# Patient Record
Sex: FEMALE | Race: OTHER | Employment: UNEMPLOYED | URBAN - METROPOLITAN AREA
[De-identification: names, ages, dates, MRNs, and addresses within clinical notes are randomized per-mention and may not be internally consistent; named-entity substitution may affect disease eponyms.]

---

## 2019-01-01 LAB
ANTIBODY SCREEN, EXTERNAL: NEGATIVE
CHLAMYDIA, EXTERNAL: NEGATIVE
HBSAG, EXTERNAL: NEGATIVE
HIV, EXTERNAL: NEGATIVE
N. GONORRHEA, EXTERNAL: NEGATIVE
RPR, EXTERNAL: NORMAL
RUBELLA, EXTERNAL: NORMAL
TYPE, ABO & RH, EXTERNAL: NORMAL

## 2019-02-18 ENCOUNTER — HOSPITAL ENCOUNTER (EMERGENCY)
Age: 19
Discharge: HOME | End: 2019-02-18
Attending: OBSTETRICS & GYNECOLOGY | Admitting: OBSTETRICS & GYNECOLOGY
Payer: COMMERCIAL

## 2019-02-18 VITALS
HEART RATE: 98 BPM | WEIGHT: 228 LBS | TEMPERATURE: 98.7 F | BODY MASS INDEX: 36.64 KG/M2 | SYSTOLIC BLOOD PRESSURE: 121 MMHG | DIASTOLIC BLOOD PRESSURE: 73 MMHG | HEIGHT: 66 IN | RESPIRATION RATE: 16 BRPM

## 2019-02-18 PROCEDURE — 99282 EMERGENCY DEPT VISIT SF MDM: CPT

## 2019-02-18 PROCEDURE — 59025 FETAL NON-STRESS TEST: CPT

## 2019-02-19 NOTE — DISCHARGE INSTRUCTIONS
Learning About Prenatal Visits  Your Care Instructions    Regular prenatal visits are very important during any pregnancy. These quick office visits may seem simple and routine. But they can help you and your baby stay healthy. Your doctor is watching for problems that can only be found by regularly checking you and your baby. The visits also give you and your doctor time to build a good relationship. Many women have prenatal visits every 4 to 6 weeks until week 28 of pregnancy. Then the visits become more frequent. This is often every 2 to 3 weeks through week 36 of pregnancy. In the final month of pregnancy, you likely will see your doctor every week. You may have a different schedule if you have a medical problem or are a teen. At different times in your pregnancy, you will have exams and tests. Some are routine. Others are done only when there is a chance of a problem. Everything healthy you do for your body helps your growing baby. Rest when you need it. Eat well, drink plenty of water, and exercise regularly. What happens during a prenatal visit? · You will have blood pressure checks, along with urine tests. You also may have blood tests. If you need to go to the bathroom while waiting for the doctor, tell the nurse. He or she will give you a sample cup so your urine can be tested. · You will be weighed and have your belly measured. · Your doctor may listen to your baby's heartbeat with a special stethoscope. · In your second trimester, your doctor will check your blood sugar (glucose tolerance test) for diabetes that can occur during pregnancy. This is gestational diabetes, which can harm your baby. · You will have tests to check for infections that could harm your . These include group B streptococcus and hepatitis B.  · Your doctor may do ultrasounds to check for problems. This also checks your baby's position. An ultrasound uses sound waves to produce a picture of your baby.   · You may have other tests at any time during your pregnancy. · Use your visits to discuss with your doctor any concerns you have. How can you care for yourself at home? · Get plenty of rest.  · Exercise every day, if your doctor says it is okay. If you have not exercised in the past, start out slowly. Take many short walks each day. · Eat a balanced diet. Make sure your diet includes plenty of beans, peas, and leafy green vegetables. · Drink plenty of fluids, enough so that your urine is light yellow or clear like water. Drink water. Cut down on drinks with caffeine, such as coffee, tea, and cola. If you have kidney, heart, or liver disease and have to limit fluids, talk with your doctor before you increase the amount of fluids you drink. · Avoid tobacco smoke, alcohol and drugs, chemical fumes, paint fumes, and poisons. Do not smoke or use tobacco. If you need help quitting, talk to your doctor about stop-smoking programs and medicines. These can increase your chances of quitting for good. · Review all of your medicines with your doctor. Some of your routine medicines may need to be changed to protect your baby. Do not stop or start taking any medicines without talking to your doctor first.  Follow-up care is a key part of your treatment and safety. Be sure to make and go to all appointments, and call your doctor if you are having problems. It's also a good idea to know your test results and keep a list of the medicines you take. Where can you learn more? Go to http://светлана-elizabeth.info/. Enter J502 in the search box to learn more about \"Learning About Prenatal Visits. \"  Current as of: September 5, 2018  Content Version: 11.9  © 6989-3976 [employer]. Care instructions adapted under license by [employer] (which disclaims liability or warranty for this information).  If you have questions about a medical condition or this instruction, always ask your healthcare professional. [employer], [facility] disclaims any warranty or liability for your use of this information. Pregnancy Precautions: Care Instructions  Your Care Instructions    There is no sure way to prevent labor before your due date ( labor) or to prevent most other pregnancy problems. But there are things you can do to increase your chances of a healthy pregnancy. Go to your appointments, follow your doctor's advice, and take good care of yourself. Eat well, and exercise (if your doctor agrees). And make sure to drink plenty of water. Follow-up care is a key part of your treatment and safety. Be sure to make and go to all appointments, and call your doctor if you are having problems. It's also a good idea to know your test results and keep a list of the medicines you take. How can you care for yourself at home? · Make sure you go to your prenatal appointments. At each visit, your doctor will check your blood pressure. Your doctor will also check to see if you have protein in your urine. High blood pressure and protein in urine are signs of preeclampsia. This condition can be dangerous for you and your baby. · Drink plenty of fluids, enough so that your urine is light yellow or clear like water. Dehydration can cause contractions. If you have kidney, heart, or liver disease and have to limit fluids, talk with your doctor before you increase the amount of fluids you drink. · Tell your doctor right away if you notice any symptoms of an infection, such as:  ? Burning when you urinate. ? A foul-smelling discharge from your vagina. ? Vaginal itching. ? Unexplained fever. ? Unusual pain or soreness in your uterus or lower belly. · Eat a balanced diet. Include plenty of foods that are high in calcium and iron. ? Foods high in calcium include milk, cheese, yogurt, almonds, and broccoli. ?  Foods high in iron include red meat, shellfish, poultry, eggs, beans, raisins, whole-grain bread, and leafy green vegetables. · Do not smoke. If you need help quitting, talk to your doctor about stop-smoking programs and medicines. These can increase your chances of quitting for good. · Do not drink alcohol or use illegal drugs. · Follow your doctor's directions about activity. Your doctor will let you know how much, if any, exercise you can do. · Ask your doctor if you can have sex. If you are at risk for early labor, your doctor may ask you to not have sex. · Take care to prevent falls. During pregnancy, your joints are loose, and your balance is off. Sports such as bicycling, skiing, or in-line skating can increase your risk of falling. And don't ride horses or motorcycles, dive, water ski, scuba dive, or parachute jump while you are pregnant. · Avoid getting very hot. Do not use saunas or hot tubs. Avoid staying out in the sun in hot weather for long periods. Take acetaminophen (Tylenol) to lower a high fever. · Do not take any over-the-counter or herbal medicines or supplements without talking to your doctor or pharmacist first.  When should you call for help? Call 911 anytime you think you may need emergency care. For example, call if:    · You passed out (lost consciousness).     · You have severe vaginal bleeding.     · You have severe pain in your belly or pelvis.     · You have had fluid gushing or leaking from your vagina and you know or think the umbilical cord is bulging into your vagina. If this happens, immediately get down on your knees so your rear end (buttocks) is higher than your head. This will decrease the pressure on the cord until help arrives.   [place] your doctor now or seek immediate medical care if:    · You have signs of preeclampsia, such as:  ? Sudden swelling of your face, hands, or feet. ? New vision problems (such as dimness or blurring).   ? A severe headache.     · You have any vaginal bleeding.     · You have belly pain or cramping.     · You have a fever.     · You have had regular contractions (with or without pain) for an hour. This means that you have 8 or more within 1 hour or 4 or more in 20 minutes after you change your position and drink fluids.     · You have a sudden release of fluid from your vagina.     · You have low back pain or pelvic pressure that does not go away.     · You notice that your baby has stopped moving or is moving much less than normal.    Watch closely for changes in your health, and be sure to contact your doctor if you have any problems. Where can you learn more? Go to http://светлана-elizabeth.info/. Enter [id number] in the search box to learn more about \"Pregnancy Precautions: Care Instructions. \"  Current as of: September 5, 2018  Content Version: 11.9  © 4880-0714 [employer]. Care instructions adapted under license by [employer] (which disclaims liability or warranty for this information). If you have questions about a medical condition or this instruction, always ask your healthcare professional. [place] [address] any warranty or liability for your use of this information. Counting Your Baby's Kicks: Care Instructions  Your Care Instructions    Counting your baby's kicks is one way your doctor can tell that your baby is healthy. Most women--especially in a first pregnancy--feel their baby move for the first time between 16 and 22 weeks. The movement may feel like flutters rather than kicks. Your baby may move more at certain times of the day. When you are active, you may notice less kicking than when you are resting. At your prenatal visits, your doctor will ask whether the baby is active. In your last trimester, your doctor may ask you to count the number of times you feel your baby move. Follow-up care is a key part of your treatment and safety. Be sure to make and go to all appointments, and call your doctor if you are having problems.  It's also a good idea to know your test results and keep a list of the medicines you take. How do you count fetal kicks? · A common method of checking your baby's movement is to count the number of kicks or moves you feel in 1 hour. Ten movements (such as kicks, flutters, or rolls) in 1 hour are normal. Some doctors suggest that you count in the morning until you get to 10 movements. Then you can quit for that day and start again the next day. · Pick your baby's most active time of day to count. This may be any time from morning to evening. · If you do not feel 10 movements in an hour, your baby may be sleeping. Wait for the next hour and count again. When should you call for help? Call your doctor now or seek immediate medical care if:    · You noticed that your baby has stopped moving or is moving much less than normal.    Watch closely for changes in your health, and be sure to contact your doctor if you have any problems. Where can you learn more? Go to http://светлана-elizabeth.info/. Enter J146 in the search box to learn more about \"Counting Your Baby's Kicks: Care Instructions. \"  Current as of: September 5, 2018  Content Version: 11.9  © 3496-9563 [employer]. Care instructions adapted under license by [employer] (which disclaims liability or warranty for this information). If you have questions about a medical condition or this instruction, always ask your healthcare professional. [place] [address] any warranty or liability for your use of this information.

## 2019-02-19 NOTE — PROGRESS NOTES
1846 Patient arrives to unit with complaints of decreased fetal movement onset two months ago. Patient states she has not been seen in one month due to difficulties transferring offices. Patient placed on EFM. [address]. Gustavo Gomez MD paged. [address] [facility]. Gustavo Gomez MD called back. SBAR discussed. Patient may be discharged at this time. 6144 I have reviewed discharge instructions with the patient and spouse. The patient and spouse verbalized understanding. Patient ambulated off unit.

## 2019-03-13 PROBLEM — B95.1 GROUP B STREPTOCOCCUS URINARY TRACT INFECTION AFFECTING PREGNANCY IN THIRD TRIMESTER: Status: ACTIVE | Noted: 2019-03-13

## 2019-03-13 PROBLEM — O23.43 GROUP B STREPTOCOCCUS URINARY TRACT INFECTION AFFECTING PREGNANCY IN THIRD TRIMESTER: Status: ACTIVE | Noted: 2019-03-13

## 2019-03-16 PROBLEM — Z3A.36 PREGNANCY WITH 36 COMPLETED WEEKS GESTATION: Status: ACTIVE | Noted: 2019-03-16

## 2019-03-18 PROBLEM — O09.899 MATERNAL VARICELLA, NON-IMMUNE: Status: ACTIVE | Noted: 2019-03-18

## 2019-03-18 PROBLEM — R79.89 ELEVATED TSH: Status: ACTIVE | Noted: 2019-03-18

## 2019-03-18 PROBLEM — Z28.39 MATERNAL VARICELLA, NON-IMMUNE: Status: ACTIVE | Noted: 2019-03-18

## 2019-03-18 PROBLEM — R82.5 POSITIVE URINE DRUG SCREEN: Status: ACTIVE | Noted: 2019-03-18

## 2019-03-25 ENCOUNTER — HOSPITAL ENCOUNTER (OUTPATIENT)
Age: 19
Discharge: HOME | End: 2019-03-25
Attending: OBSTETRICS & GYNECOLOGY | Admitting: OBSTETRICS & GYNECOLOGY
Payer: COMMERCIAL

## 2019-03-25 VITALS
HEART RATE: 92 BPM | WEIGHT: 237 LBS | RESPIRATION RATE: 18 BRPM | TEMPERATURE: 98.6 F | DIASTOLIC BLOOD PRESSURE: 78 MMHG | BODY MASS INDEX: 38.09 KG/M2 | HEIGHT: 66 IN | SYSTOLIC BLOOD PRESSURE: 124 MMHG

## 2019-03-25 PROBLEM — Z3A.37 37 WEEKS GESTATION OF PREGNANCY: Status: ACTIVE | Noted: 2019-03-25

## 2019-03-25 PROBLEM — Z33.1 IUP (INTRAUTERINE PREGNANCY), INCIDENTAL: Status: ACTIVE | Noted: 2019-03-25

## 2019-03-25 PROCEDURE — 59025 FETAL NON-STRESS TEST: CPT

## 2019-03-25 NOTE — DISCHARGE INSTRUCTIONS
Patient Education   Patient Education   Patient Education        Week 37 of Your Pregnancy: Care Instructions  Your Care Instructions    You are near the end of your pregnancy--and you're probably pretty uncomfortable. It may be harder to walk around. Lying down probably isn't comfortable either. You may have trouble getting to sleep or staying asleep. Most women deliver their babies between 40 and 41 weeks. This is a good time to think about packing a bag for the hospital with items you'll need. Then you'll be ready when labor starts. Follow-up care is a key part of your treatment and safety. Be sure to make and go to all appointments, and call your doctor if you are having problems. It's also a good idea to know your test results and keep a list of the medicines you take. How can you care for yourself at home? Learn about breastfeeding  · Breastfeeding is best for your baby and good for you. · Breast milk has antibodies to help your baby fight infections. · Mothers who breastfeed often lose weight faster, because making milk burns calories. · Learning the best ways to hold your baby will make breastfeeding easier. · Let your partner bathe and diaper the baby to keep your partner from feeling left out. Snuggle together when you breastfeed. · You may want to learn how to use a breast pump and store your milk. · If you choose to bottle feed, make the feeding feel like breastfeeding so you can bond with your baby. Always hold your baby and the bottle. Do not prop bottles or let your baby fall asleep with a bottle. Learn about crying  · It is common for babies to cry for 1 to 3 hours a day. Some cry more, some cry less. · Babies don't cry to make you upset or because you are a bad parent. · Crying is how your baby communicates. Your baby may be hungry; have gas; need a diaper change; or feel cold, warm, tired, lonely, or tense. Sometimes babies cry for unknown reasons.   · If you respond to your baby's needs, he or she will learn to trust you. · Try to stay calm when your baby cries. Your baby may get more upset if he or she senses that you are upset. Know how to care for your   · Your baby's umbilical cord stump will drop off on its own, usually between 1 and 2 weeks. To care for your baby's umbilical cord area:  ? Clean the area at the bottom of the cord 2 or 3 times a day. ? Pay special attention to the area where the cord attaches to the skin. ? Keep the diaper folded below the cord. ? Use a damp washcloth or cotton ball to sponge bathe your baby until the stump has come off. · Your baby's first dark stool is called meconium. After the meconium is passed, your baby will develop his or her own bowel pattern. ? Some babies, especially  babies, have several bowel movements a day. Others have one or two a day, or one every 2 to 3 days. ?  babies often have loose, yellow stools. Formula-fed babies have more formed stools. ? If your baby's stools look like little pellets, he or she is constipated. After 2 days of constipation, call your baby's doctor. · If your baby will be circumcised, you can care for him at home. ? Gently rinse his penis with warm water after every diaper change. Do not try to remove the film that forms on the penis. This film will go away on its own. Pat dry. ? Put petroleum ointment, such as Vaseline, on the area of the diaper that will touch your baby's penis. This will keep the diaper from sticking to your baby. ? Ask the doctor about giving your baby acetaminophen (Tylenol) for pain. Where can you learn more? Go to http://светлана-elizabeth.info/. Enter [id number] in the search box to learn more about \"Week 37 of Your Pregnancy: Care Instructions. \"  Current as of: 2018  Content Version: 11.9  © 5783-9894 [employer].  Care instructions adapted under license by [employer] (which disclaims liability or warranty for this information). If you have questions about a medical condition or this instruction, always ask your healthcare professional. [address] any warranty or liability for your use of this information. Counting Your Baby's Kicks: Care Instructions  Your Care Instructions    Counting your baby's kicks is one way your doctor can tell that your baby is healthy. Most women--especially in a first pregnancy--feel their baby move for the first time between 16 and 22 weeks. The movement may feel like flutters rather than kicks. Your baby may move more at certain times of the day. When you are active, you may notice less kicking than when you are resting. At your prenatal visits, your doctor will ask whether the baby is active. In your last trimester, your doctor may ask you to count the number of times you feel your baby move. Follow-up care is a key part of your treatment and safety. Be sure to make and go to all appointments, and call your doctor if you are having problems. It's also a good idea to know your test results and keep a list of the medicines you take. How do you count fetal kicks? · A common method of checking your baby's movement is to count the number of kicks or moves you feel in 1 hour. Ten movements (such as kicks, flutters, or rolls) in 1 hour are normal. Some doctors suggest that you count in the morning until you get to 10 movements. Then you can quit for that day and start again the next day. · Pick your baby's most active time of day to count. This may be any time from morning to evening. · If you do not feel 10 movements in an hour, your baby may be sleeping. Wait for the next hour and count again. When should you call for help?   Call your doctor now or seek immediate medical care if:    · You noticed that your baby has stopped moving or is moving much less than normal.    Watch closely for changes in your health, and be sure to contact your doctor if you have any problems. Where can you learn more? Go to http://светлана-elizabeth.info/. Enter V698 in the search box to learn more about \"Counting Your Baby's Kicks: Care Instructions. \"  Current as of: September 5, 2018  Content Version: 11.9  © 0569-6847 [employer]. Care instructions adapted under license by [employer] (which disclaims liability or warranty for this information). If you have questions about a medical condition or this instruction, always ask your healthcare professional. [address] any warranty or liability for your use of this information. Learning About When to Call Your Doctor During Pregnancy (After 20 Weeks)  Your Care Instructions  It's common to have concerns about what might be a problem during pregnancy. Although most pregnant women don't have any serious problems, it's important to know when to call your doctor if you have certain symptoms or signs of labor. These are general suggestions. Your doctor may give you some more information about when to call. When to call your doctor (after 20 weeks)  Call 911 anytime you think you may need emergency care. For example, call if:  · You have severe vaginal bleeding. · You have sudden, severe pain in your belly. · You passed out (lost consciousness). · You have a seizure. · You see or feel the umbilical cord. · You think you are about to deliver your baby and can't make it safely to the hospital.  Call your doctor now or seek immediate medical care if:  · You have vaginal bleeding. · You have belly pain. · You have a fever. · You have symptoms of preeclampsia, such as:  ? Sudden swelling of your face, hands, or feet. ? New vision problems (such as dimness or blurring). ? A severe headache. · You have a sudden release of fluid from your vagina. (You think your water broke.)  · You think that you may be in labor.  This means that you've had at least 4 contractions within 20 minutes or at least 8 contractions in an hour. · You notice that your baby has stopped moving or is moving much less than normal.  · You have symptoms of a urinary tract infection. These may include:  ? Pain or burning when you urinate. ? A frequent need to urinate without being able to pass much urine. ? Pain in the flank, which is just below the rib cage and above the waist on either side of the back. ? Blood in your urine. Watch closely for changes in your health, and be sure to contact your doctor if:  · You have vaginal discharge that smells bad. · You have skin changes, such as:  ? A rash. ? Itching. ? Yellow color to your skin. · You have other concerns about your pregnancy. If you have labor signs at 37 weeks or more  If you have signs of labor at 37 weeks or more, your doctor may tell you to call when your labor becomes more active. Symptoms of active labor include:  · Contractions that are regular. · Contractions that are less than 5 minutes apart. · Contractions that are hard to talk through. Follow-up care is a key part of your treatment and safety. Be sure to make and go to all appointments, and call your doctor if you are having problems. It's also a good idea to know your test results and keep a list of the medicines you take. Where can you learn more? Go to http://светлана-elizabeth.info/. Enter  in the search box to learn more about \"Learning About When to Call Your Doctor During Pregnancy (After 20 Weeks). \"  Current as of: September 5, 2018  Content Version: 11.9  © 3330-6433 [employer]. Care instructions adapted under license by [employer] (which disclaims liability or warranty for this information). If you have questions about a medical condition or this instruction, always ask your healthcare professional. [address] any warranty or liability for your use of this information.

## 2019-03-25 NOTE — PROGRESS NOTES
1900 NST reactive, EFM removed. Patient reports fetal movment, denies pain or concerns. 6210 [facility] paged Dr. Shawna Sosa with prompt return call. MD notified of patient, VS WNL, denies concerns, good fetal movement, NST reactive. Discharge orders received    1913 Discharge instructions reviewed with patient. Patient verbalizes understanding and denies concerns or needs. 1916 patient ambulates off unit.

## 2019-04-15 PROBLEM — O48.0 POST-TERM PREGNANCY, 40-42 WEEKS OF GESTATION: Status: ACTIVE | Noted: 2019-03-16

## 2019-04-15 PROBLEM — Z3A.40 40 WEEKS GESTATION OF PREGNANCY: Status: ACTIVE | Noted: 2019-03-25

## 2019-04-16 ENCOUNTER — HOSPITAL ENCOUNTER (INPATIENT)
Age: 19
LOS: 4 days | Discharge: HOME | DRG: 560 | End: 2019-04-20
Attending: OBSTETRICS & GYNECOLOGY | Admitting: OBSTETRICS & GYNECOLOGY
Payer: COMMERCIAL

## 2019-04-16 PROBLEM — O36.8130 DECREASED FETAL MOVEMENTS IN THIRD TRIMESTER: Status: ACTIVE | Noted: 2019-04-16

## 2019-04-16 LAB
BASOPHILS # BLD: 0 K/UL (ref 0–0.1)
BASOPHILS NFR BLD: 0 % (ref 0–2)
DIFFERENTIAL METHOD BLD: ABNORMAL
EOSINOPHIL # BLD: 0.1 K/UL (ref 0–0.4)
EOSINOPHIL NFR BLD: 1 % (ref 0–5)
ERYTHROCYTE [DISTWIDTH] IN BLOOD BY AUTOMATED COUNT: 13.9 % (ref 11.6–14.5)
HCT VFR BLD AUTO: 37 % (ref 35–45)
HGB BLD-MCNC: 12.3 G/DL (ref 12–16)
LYMPHOCYTES # BLD: 1.9 K/UL (ref 0.9–3.6)
LYMPHOCYTES NFR BLD: 12 % (ref 21–52)
MCH RBC QN AUTO: 28.3 PG (ref 24–34)
MCHC RBC AUTO-ENTMCNC: 33.2 G/DL (ref 31–37)
MCV RBC AUTO: 85.3 FL (ref 74–97)
MONOCYTES # BLD: 0.7 K/UL (ref 0.05–1.2)
MONOCYTES NFR BLD: 4 % (ref 3–10)
NEUTS SEG # BLD: 13.5 K/UL (ref 1.8–8)
NEUTS SEG NFR BLD: 83 % (ref 40–73)
PLATELET # BLD AUTO: 240 K/UL (ref 135–420)
PMV BLD AUTO: 9.8 FL (ref 9.2–11.8)
RBC # BLD AUTO: 4.34 M/UL (ref 4.2–5.3)
WBC # BLD AUTO: 16.1 K/UL (ref 4.6–13.2)

## 2019-04-16 PROCEDURE — 85025 COMPLETE CBC W/AUTO DIFF WBC: CPT

## 2019-04-16 PROCEDURE — 36415 COLL VENOUS BLD VENIPUNCTURE: CPT

## 2019-04-16 PROCEDURE — 59025 FETAL NON-STRESS TEST: CPT

## 2019-04-16 PROCEDURE — 76817 TRANSVAGINAL US OBSTETRIC: CPT

## 2019-04-16 PROCEDURE — 86900 BLOOD TYPING SEROLOGIC ABO: CPT

## 2019-04-16 PROCEDURE — 65270000029 HC RM PRIVATE

## 2019-04-16 PROCEDURE — 99282 EMERGENCY DEPT VISIT SF MDM: CPT

## 2019-04-16 RX ORDER — BUTORPHANOL TARTRATE 2 MG/ML
2 INJECTION INTRAMUSCULAR; INTRAVENOUS
Status: DISCONTINUED | OUTPATIENT
Start: 2019-04-16 | End: 2019-04-18 | Stop reason: HOSPADM

## 2019-04-16 RX ORDER — MINERAL OIL
30 OIL (ML) ORAL AS NEEDED
Status: COMPLETED | OUTPATIENT
Start: 2019-04-16 | End: 2019-04-18

## 2019-04-16 RX ORDER — HYDROMORPHONE HYDROCHLORIDE 1 MG/ML
1 INJECTION, SOLUTION INTRAMUSCULAR; INTRAVENOUS; SUBCUTANEOUS
Status: DISCONTINUED | OUTPATIENT
Start: 2019-04-16 | End: 2019-04-18 | Stop reason: HOSPADM

## 2019-04-16 RX ORDER — TERBUTALINE SULFATE 1 MG/ML
0.25 INJECTION SUBCUTANEOUS
Status: DISCONTINUED | OUTPATIENT
Start: 2019-04-16 | End: 2019-04-18 | Stop reason: HOSPADM

## 2019-04-16 RX ORDER — OXYTOCIN/RINGER'S LACTATE 20/1000 ML
125 PLASTIC BAG, INJECTION (ML) INTRAVENOUS CONTINUOUS
Status: DISCONTINUED | OUTPATIENT
Start: 2019-04-16 | End: 2019-04-18 | Stop reason: HOSPADM

## 2019-04-16 RX ORDER — LIDOCAINE HYDROCHLORIDE 10 MG/ML
20 INJECTION, SOLUTION EPIDURAL; INFILTRATION; INTRACAUDAL; PERINEURAL AS NEEDED
Status: DISCONTINUED | OUTPATIENT
Start: 2019-04-16 | End: 2019-04-18 | Stop reason: HOSPADM

## 2019-04-16 RX ORDER — METHYLERGONOVINE MALEATE 0.2 MG/ML
0.2 INJECTION INTRAVENOUS AS NEEDED
Status: DISCONTINUED | OUTPATIENT
Start: 2019-04-16 | End: 2019-04-18 | Stop reason: HOSPADM

## 2019-04-16 RX ORDER — NALBUPHINE HYDROCHLORIDE 10 MG/ML
10 INJECTION, SOLUTION INTRAMUSCULAR; INTRAVENOUS; SUBCUTANEOUS
Status: DISCONTINUED | OUTPATIENT
Start: 2019-04-16 | End: 2019-04-18 | Stop reason: HOSPADM

## 2019-04-16 RX ORDER — OXYTOCIN/RINGER'S LACTATE 20/1000 ML
999 PLASTIC BAG, INJECTION (ML) INTRAVENOUS ONCE
Status: ACTIVE | OUTPATIENT
Start: 2019-04-16 | End: 2019-04-17

## 2019-04-16 RX ORDER — ONDANSETRON 2 MG/ML
4 INJECTION INTRAMUSCULAR; INTRAVENOUS
Status: DISCONTINUED | OUTPATIENT
Start: 2019-04-16 | End: 2019-04-18 | Stop reason: HOSPADM

## 2019-04-16 RX ORDER — OXYTOCIN/0.9 % SODIUM CHLORIDE 30/500 ML
1-25 PLASTIC BAG, INJECTION (ML) INTRAVENOUS
Status: DISCONTINUED | OUTPATIENT
Start: 2019-04-17 | End: 2019-04-18 | Stop reason: HOSPADM

## 2019-04-16 RX ORDER — SODIUM CHLORIDE, SODIUM LACTATE, POTASSIUM CHLORIDE, CALCIUM CHLORIDE 600; 310; 30; 20 MG/100ML; MG/100ML; MG/100ML; MG/100ML
125 INJECTION, SOLUTION INTRAVENOUS CONTINUOUS
Status: DISCONTINUED | OUTPATIENT
Start: 2019-04-16 | End: 2019-04-18 | Stop reason: HOSPADM

## 2019-04-17 ENCOUNTER — ANESTHESIA EVENT (OUTPATIENT)
Dept: LABOR AND DELIVERY | Age: 19
DRG: 560 | End: 2019-04-17
Payer: COMMERCIAL

## 2019-04-17 ENCOUNTER — ANESTHESIA (OUTPATIENT)
Dept: LABOR AND DELIVERY | Age: 19
DRG: 560 | End: 2019-04-17
Payer: COMMERCIAL

## 2019-04-17 LAB
ABO + RH BLD: NORMAL
BLOOD GROUP ANTIBODIES SERPL: NORMAL
SPECIMEN EXP DATE BLD: NORMAL

## 2019-04-17 PROCEDURE — 3E0R3BZ INTRODUCTION OF ANESTHETIC AGENT INTO SPINAL CANAL, PERCUTANEOUS APPROACH: ICD-10-PCS | Performed by: ANESTHESIOLOGY

## 2019-04-17 PROCEDURE — 3E033VJ INTRODUCTION OF OTHER HORMONE INTO PERIPHERAL VEIN, PERCUTANEOUS APPROACH: ICD-10-PCS | Performed by: ADVANCED PRACTICE MIDWIFE

## 2019-04-17 PROCEDURE — 74011250636 HC RX REV CODE- 250/636: Performed by: OBSTETRICS & GYNECOLOGY

## 2019-04-17 PROCEDURE — 74011000250 HC RX REV CODE- 250: Performed by: ANESTHESIOLOGY

## 2019-04-17 PROCEDURE — 77030007879 HC KT SPN EPDRL TELE -B: Performed by: ANESTHESIOLOGY

## 2019-04-17 PROCEDURE — 65270000029 HC RM PRIVATE

## 2019-04-17 PROCEDURE — 74011000250 HC RX REV CODE- 250

## 2019-04-17 PROCEDURE — 74011250636 HC RX REV CODE- 250/636: Performed by: ANESTHESIOLOGY

## 2019-04-17 PROCEDURE — 75410000002 HC LABOR FEE PER 1 HR

## 2019-04-17 RX ORDER — NALOXONE HYDROCHLORIDE 0.4 MG/ML
0.2 INJECTION, SOLUTION INTRAMUSCULAR; INTRAVENOUS; SUBCUTANEOUS AS NEEDED
Status: DISCONTINUED | OUTPATIENT
Start: 2019-04-17 | End: 2019-04-18 | Stop reason: HOSPADM

## 2019-04-17 RX ORDER — FENTANYL CITRATE 50 UG/ML
INJECTION, SOLUTION INTRAMUSCULAR; INTRAVENOUS AS NEEDED
Status: DISCONTINUED | OUTPATIENT
Start: 2019-04-17 | End: 2019-04-18 | Stop reason: HOSPADM

## 2019-04-17 RX ORDER — LIDOCAINE HYDROCHLORIDE AND EPINEPHRINE 15; 5 MG/ML; UG/ML
INJECTION, SOLUTION EPIDURAL AS NEEDED
Status: DISCONTINUED | OUTPATIENT
Start: 2019-04-17 | End: 2019-04-18 | Stop reason: HOSPADM

## 2019-04-17 RX ORDER — DIPHENHYDRAMINE HYDROCHLORIDE 50 MG/ML
25 INJECTION, SOLUTION INTRAMUSCULAR; INTRAVENOUS
Status: DISCONTINUED | OUTPATIENT
Start: 2019-04-17 | End: 2019-04-18 | Stop reason: HOSPADM

## 2019-04-17 RX ORDER — NALBUPHINE HYDROCHLORIDE 10 MG/ML
2.5 INJECTION, SOLUTION INTRAMUSCULAR; INTRAVENOUS; SUBCUTANEOUS
Status: DISCONTINUED | OUTPATIENT
Start: 2019-04-17 | End: 2019-04-18 | Stop reason: HOSPADM

## 2019-04-17 RX ORDER — FENTANYL/ROPIVACAINE/NS/PF 2MCG/ML-.1
1-22 PLASTIC BAG, INJECTION (ML) EPIDURAL
Status: DISCONTINUED | OUTPATIENT
Start: 2019-04-17 | End: 2019-04-18 | Stop reason: HOSPADM

## 2019-04-17 RX ORDER — SODIUM CHLORIDE 0.9 % (FLUSH) 0.9 %
5-40 SYRINGE (ML) INJECTION EVERY 8 HOURS
Status: DISCONTINUED | OUTPATIENT
Start: 2019-04-17 | End: 2019-04-18 | Stop reason: HOSPADM

## 2019-04-17 RX ORDER — SODIUM CHLORIDE 0.9 % (FLUSH) 0.9 %
5-40 SYRINGE (ML) INJECTION AS NEEDED
Status: DISCONTINUED | OUTPATIENT
Start: 2019-04-17 | End: 2019-04-18 | Stop reason: HOSPADM

## 2019-04-17 RX ORDER — FENTANYL CITRATE 50 UG/ML
100 INJECTION, SOLUTION INTRAMUSCULAR; INTRAVENOUS ONCE
Status: DISPENSED | OUTPATIENT
Start: 2019-04-17 | End: 2019-04-18

## 2019-04-17 RX ADMIN — BUTORPHANOL TARTRATE 2 MG: 2 INJECTION, SOLUTION INTRAMUSCULAR; INTRAVENOUS at 18:28

## 2019-04-17 RX ADMIN — VANCOMYCIN HYDROCHLORIDE 1000 MG: 1 INJECTION, POWDER, LYOPHILIZED, FOR SOLUTION INTRAVENOUS at 23:22

## 2019-04-17 RX ADMIN — BUTORPHANOL TARTRATE 2 MG: 2 INJECTION, SOLUTION INTRAMUSCULAR; INTRAVENOUS at 00:35

## 2019-04-17 RX ADMIN — Medication 8 MILLI-UNITS/MIN: at 12:39

## 2019-04-17 RX ADMIN — Medication 2 MILLI-UNITS/MIN: at 10:57

## 2019-04-17 RX ADMIN — VANCOMYCIN HYDROCHLORIDE 1000 MG: 1 INJECTION, POWDER, LYOPHILIZED, FOR SOLUTION INTRAVENOUS at 10:55

## 2019-04-17 RX ADMIN — SODIUM CHLORIDE, SODIUM LACTATE, POTASSIUM CHLORIDE, AND CALCIUM CHLORIDE 125 ML/HR: 600; 310; 30; 20 INJECTION, SOLUTION INTRAVENOUS at 09:31

## 2019-04-17 RX ADMIN — LIDOCAINE HYDROCHLORIDE AND EPINEPHRINE 5 ML: 15; 5 INJECTION, SOLUTION EPIDURAL at 21:06

## 2019-04-17 RX ADMIN — FENTANYL CITRATE 100 MCG: 50 INJECTION, SOLUTION INTRAMUSCULAR; INTRAVENOUS at 21:07

## 2019-04-17 RX ADMIN — ONDANSETRON 4 MG: 2 INJECTION INTRAMUSCULAR; INTRAVENOUS at 21:43

## 2019-04-17 RX ADMIN — SODIUM CHLORIDE, SODIUM LACTATE, POTASSIUM CHLORIDE, AND CALCIUM CHLORIDE 1000 ML: 600; 310; 30; 20 INJECTION, SOLUTION INTRAVENOUS at 20:52

## 2019-04-17 RX ADMIN — ROPIVACAINE HYDROCHLORIDE 15 ML/HR: 10 INJECTION, SOLUTION EPIDURAL at 21:10

## 2019-04-17 NOTE — PROGRESS NOTES
2150-Assumed care of pt at this time. Pt comes to unit with c/o decreased fetal mvmt. Pt states she has not felt the baby move really at all since [address] and US applied. 2200-O'Fabiana at bedside to do fetal ultrasound for JAMES. SVE 1/50/-3.  
 
2225-O'Fabiana admits pt for induction at this time. Cook balloon to be inserted over night. 2230-Cook balloon inserted at this time. Balloon filled with 80/80 normal saline 
 
0720-Bedside and verbal shift change report given to JAI Davidson RN (oncoming nurse) by REDD Lozano RN (offgoing nurse). Report included the following information SBAR, Kardex and MAR.

## 2019-04-17 NOTE — PROGRESS NOTES
Labor Progress Note Patient seen, fetal heart rate and contraction pattern evaluated, patient examined. Patient had some cramping and bloody show overnight. No data found. Physical Exam: 
Cervical Exam:  Not performed Membranes:  Intact Uterine Activity: occasional 
Fetal Heart Rate: Reactive Assessment/Plan: 
Continue plan for vaginal delivery. Remove cook this morning and start pitocin induction of labor.

## 2019-04-17 NOTE — PROGRESS NOTES
Labor Progress Note Patient seen, fetal heart rate and contraction pattern evaluated, patient examined. Patient presented to labor and delivery for decreased fetal movement. Only felt the baby move once this morning. Denies vaginal bleeding, leakage of fluid, contractions. She has been eating and drinking today. Patient Vitals for the past 1 hrs: 
 BP Temp Pulse Resp  
04/16/19 2141 122/78 97.7 °F (36.5 °C) (!) 118 14 Physical Exam: 
Cervical Exam:  1 cm dilated/50/-3 Membranes:  Intact Uterine Activity: None Fetal Heart Rate: Baseline: 140's per minute. Not reactive yet. Ultrasound performed. MVP 3.22. Baby appears to be OP. Assessment/Plan: 
Continue to monitor baby. Need to get reactive NST prior to discharging patient.

## 2019-04-17 NOTE — H&P
History & Physical 
 
Name: Herman Godinez MRN: [id number]  SSN: [id number] YOB: 2000  Age: 22 y.o. Sex: female Subjective:  
 
Estimated Date of Delivery: 19 OB History  Para Term  AB Living 1 SAB TAB Ectopic Molar Multiple Live Births # Outcome Date GA Lbr Jin/2nd Weight Sex Delivery Anes PTL Lv  
1 Current Ms. Doyle Valdes is admitted with pregnancy at 40w4d for induction of labor due to nonreactive nonstress test and decreased fetal movement, post dates. Prenatal course was complicated by GBS UTI, maternal varicella non immune. Please see prenatal records for details. Past Medical History:  
Diagnosis Date  Epilepsy ([address]  Migraines  PTSD (post-traumatic stress disorder)  STD (female) 2018 GC/CT  
 Trauma Past Surgical History:  
Procedure Laterality Date  HX HERNIA REPAIR    
 HX OTHER SURGICAL Social History Occupational History  Not on file Tobacco Use  Smoking status: Never Smoker  Smokeless tobacco: Never Used Substance and Sexual Activity  Alcohol use: No  
  Frequency: Never  Drug use: No  
 Sexual activity: Yes  
  Partners: Male Birth control/protection: None Family History Problem Relation Age of Onset  Bleeding Prob Mother  Thyroid Disease Mother Allergies Allergen Reactions  Pcn [Penicillins] Hives  Sulfa (Sulfonamide Antibiotics) Hives Prior to Admission medications Medication Sig Start Date End Date Taking? Authorizing Provider PNV66-Iron Fumarate-FA-DSS-DHA 26-1.2- mg cap Take  by mouth. Yes Provider, Historical  
Omega 3-D03-FN-N0-Hjbtyhotsms 500 mg-500 mcg -1 mg-12.5 mg cap Take 1 Tab by mouth. Provider, Historical  
  
 
Review of Systems: Pertinent items are noted in the History of Present Illness. Objective:  
 
Vitals: 
Vitals:  
 19 2141 BP: 122/78 Pulse: (!) 118  
 Resp: 14 Temp: 97.7 °F (36.5 °C) Physical Exam: 
Patient without distress. Abdomen: soft, nontender, EFW 8 lbs. Cervical Exam: 1 cm dilated/50/-2/posterior Membranes:  Intact Fetal Heart Rate: Baseline: 140's per minute Prenatal Labs:  
Lab Results Component Value Date/Time GrBStrep, External POSITIVE 03/11/2019 Impression/Plan:  
 
Principal Problem: 
  Decreased fetal movements in third trimester (4/16/2019) Active Problems: 
  Post-term pregnancy, 40-42 weeks of gestation (3/16/2019) Overview: 3/15/19 EFW 38%, 5 lb. 15 oz. Female fetus. \"Sandi\" Plan: Admit for induction of labor. Group B Strep positive, will treat prophylactically with vancomycin.

## 2019-04-17 NOTE — PROGRESS NOTES
Bedside and Verbal shift change report given to AMPARO Davidson RN (oncoming nurse) by REDD Quintero RN (offgoing nurse). Report included the following information SBAR, Kardex, Procedure Summary, Intake/Output, MAR and Recent Results. 8510 Dr. Marylee Goldstein at bedside. POC discussed with patient. Will take balloon out at 1030 and start pitocin. [address] removed. 80 ml of NS out of uterine and vaginal balloons removed. SVE 5/70/-2 
 
1055 Vancomycin and Pitocin started. 1100 Pt assisted to high zamarripa's position. 1135 EFM readjusted. 1240 pt ambulated to bathroom to void. 12 Pt assisted to left lateral side with peanut ball between legs 1340 PT assisted to right lateral side with peanut ball. Difficulty tracing contractions. 26 Pt assisted to high zamarripa's position. [place] readjusted. 1500 PT assisted to left lateral side with peanut ball between legs. 1555 PT on right lateral side with peanut ball between legs. PT sleeping. EFM readjusted. 1 Pt assisted to semi-zamarripa's position. [place] readjusted. 5 Dr. Eloy Talavera called for update. SVE requested. SVE unchanged. Pt on 20 delvin-units of pitocin since 1613. Orders received to continue pitocin until 8pm. Stop Pitocin at 8pm and restart at 2am.  
Pt may eat dinner during the pitocin break. 1815 Pt more uncomfortable. Rates pain 10/10. Pt ambulated to bathroom to void. [place] readjusted. 1830 Stadol administered. 1910 Bedside and Verbal shift change report given to REDD Quintero Rn (oncoming nurse) by Kaylyn Millard (offgoing nurse). Report included the following information SBAR, Kardex, Procedure Summary, Intake/Output, MAR and Recent Results.

## 2019-04-17 NOTE — PROGRESS NOTES
Patient arrives to unit with complaints of decreased fetal movement. Last time she felt movement was at 0600. Patient states she has tries eating PB&J sandwiches and drinking juice. Patient is  40w4d. Patient placed in [address] [place] [address] and placed of [facility].

## 2019-04-18 PROBLEM — O36.8130 DECREASED FETAL MOVEMENTS IN THIRD TRIMESTER: Status: RESOLVED | Noted: 2019-04-16 | Resolved: 2019-04-18

## 2019-04-18 PROBLEM — O23.43 GROUP B STREPTOCOCCUS URINARY TRACT INFECTION AFFECTING PREGNANCY IN THIRD TRIMESTER: Status: RESOLVED | Noted: 2019-03-13 | Resolved: 2019-04-18

## 2019-04-18 PROBLEM — O48.0 POST-TERM PREGNANCY, 40-42 WEEKS OF GESTATION: Status: RESOLVED | Noted: 2019-03-16 | Resolved: 2019-04-18

## 2019-04-18 PROBLEM — B95.1 GROUP B STREPTOCOCCUS URINARY TRACT INFECTION AFFECTING PREGNANCY IN THIRD TRIMESTER: Status: RESOLVED | Noted: 2019-03-13 | Resolved: 2019-04-18

## 2019-04-18 PROBLEM — Z3A.40 40 WEEKS GESTATION OF PREGNANCY: Status: RESOLVED | Noted: 2019-03-25 | Resolved: 2019-04-18

## 2019-04-18 PROBLEM — Z33.1 IUP (INTRAUTERINE PREGNANCY), INCIDENTAL: Status: RESOLVED | Noted: 2019-03-25 | Resolved: 2019-04-18

## 2019-04-18 LAB — CREAT SERPL-MCNC: 0.66 MG/DL (ref 0.6–1.3)

## 2019-04-18 PROCEDURE — 75410000000 HC DELIVERY VAGINAL/SINGLE

## 2019-04-18 PROCEDURE — 74011250637 HC RX REV CODE- 250/637: Performed by: ADVANCED PRACTICE MIDWIFE

## 2019-04-18 PROCEDURE — 75410000002 HC LABOR FEE PER 1 HR

## 2019-04-18 PROCEDURE — 74011250637 HC RX REV CODE- 250/637: Performed by: OBSTETRICS & GYNECOLOGY

## 2019-04-18 PROCEDURE — 65270000029 HC RM PRIVATE

## 2019-04-18 PROCEDURE — 82565 ASSAY OF CREATININE: CPT

## 2019-04-18 PROCEDURE — 74011250637 HC RX REV CODE- 250/637

## 2019-04-18 PROCEDURE — 36415 COLL VENOUS BLD VENIPUNCTURE: CPT

## 2019-04-18 PROCEDURE — 76060000078 HC EPIDURAL ANESTHESIA

## 2019-04-18 PROCEDURE — 75410000003 HC RECOV DEL/VAG/CSECN EA 0.5 HR

## 2019-04-18 RX ORDER — LIDOCAINE HYDROCHLORIDE 10 MG/ML
INJECTION INFILTRATION; PERINEURAL
Status: DISCONTINUED
Start: 2019-04-18 | End: 2019-04-18 | Stop reason: WASHOUT

## 2019-04-18 RX ORDER — MISOPROSTOL 100 UG/1
TABLET ORAL
Status: COMPLETED
Start: 2019-04-18 | End: 2019-04-18

## 2019-04-18 RX ORDER — FENTANYL/ROPIVACAINE/NS/PF 2MCG/ML-.1
PLASTIC BAG, INJECTION (ML) EPIDURAL
Status: DISPENSED
Start: 2019-04-18 | End: 2019-04-18

## 2019-04-18 RX ORDER — ZOLPIDEM TARTRATE 5 MG/1
5 TABLET ORAL
Status: DISCONTINUED | OUTPATIENT
Start: 2019-04-18 | End: 2019-04-20 | Stop reason: HOSPADM

## 2019-04-18 RX ORDER — OXYCODONE AND ACETAMINOPHEN 5; 325 MG/1; MG/1
1 TABLET ORAL
Status: DISCONTINUED | OUTPATIENT
Start: 2019-04-18 | End: 2019-04-20 | Stop reason: HOSPADM

## 2019-04-18 RX ORDER — IBUPROFEN 400 MG/1
800 TABLET ORAL EVERY 8 HOURS
Status: DISCONTINUED | OUTPATIENT
Start: 2019-04-18 | End: 2019-04-20 | Stop reason: HOSPADM

## 2019-04-18 RX ORDER — ACETAMINOPHEN 325 MG/1
650 TABLET ORAL
Status: DISCONTINUED | OUTPATIENT
Start: 2019-04-18 | End: 2019-04-20 | Stop reason: HOSPADM

## 2019-04-18 RX ORDER — AMOXICILLIN 250 MG
1 CAPSULE ORAL
Status: DISCONTINUED | OUTPATIENT
Start: 2019-04-18 | End: 2019-04-20 | Stop reason: HOSPADM

## 2019-04-18 RX ORDER — CARBOPROST TROMETHAMINE 250 UG/ML
INJECTION, SOLUTION INTRAMUSCULAR
Status: DISPENSED
Start: 2019-04-18 | End: 2019-04-18

## 2019-04-18 RX ORDER — PROMETHAZINE HYDROCHLORIDE 25 MG/ML
25 INJECTION, SOLUTION INTRAMUSCULAR; INTRAVENOUS
Status: DISCONTINUED | OUTPATIENT
Start: 2019-04-18 | End: 2019-04-20 | Stop reason: HOSPADM

## 2019-04-18 RX ADMIN — Medication 30 ML: at 06:40

## 2019-04-18 RX ADMIN — OXYCODONE HYDROCHLORIDE AND ACETAMINOPHEN 1 TABLET: 5; 325 TABLET ORAL at 20:23

## 2019-04-18 RX ADMIN — MISOPROSTOL 1000 MCG: 100 TABLET ORAL at 06:55

## 2019-04-18 RX ADMIN — IBUPROFEN 800 MG: 400 TABLET, FILM COATED ORAL at 09:43

## 2019-04-18 RX ADMIN — ACETAMINOPHEN 650 MG: 325 TABLET ORAL at 14:34

## 2019-04-18 RX ADMIN — ROPIVACAINE HYDROCHLORIDE 15 ML/HR: 10 INJECTION, SOLUTION EPIDURAL at 02:04

## 2019-04-18 RX ADMIN — IBUPROFEN 800 MG: 400 TABLET, FILM COATED ORAL at 17:46

## 2019-04-18 NOTE — PROGRESS NOTES
Bedside and Verbal shift change report given to SHELIA Galdamez (oncoming nurse) by REDD Calix (offgoing nurse). Report included the following information SBAR, Kardex, Intake/Output, MAR and Recent Results.

## 2019-04-18 NOTE — PROGRESS NOTES
Bedside and Verbal shift change report given to Amarilys Macias (oncoming nurse) by REDD Calix (offgoing nurse). Report included the following information SBAR, Kardex, Intake/Output, MAR and Recent Results.

## 2019-04-18 NOTE — ANESTHESIA PROCEDURE NOTES
Epidural Block    Start time: 4/17/2019 8:59 PM  End time: 4/17/2019 9:22 PM  Performed by: Rose Mckeno MD  Authorized by:  Rose Mckeon MD     Pre-Procedure  Indication: at surgeon's request, post-op pain management, procedure for pain and labor epidural    Preanesthetic Checklist: patient identified, risks and benefits discussed, anesthesia consent, site marked, patient being monitored, timeout performed and anesthesia consent      Epidural:   Patient position:  Seated  Prep region:  Lumbar  Prep: Chlorhexidine    Location:  L3-4    Needle and Epidural Catheter:   Needle Type:  Tuohy  Needle Gauge:  17 G  Injection Technique:  Loss of resistance using saline  Attempts:  1  Catheter Size:  20 G  Catheter at Skin Depth (cm):  11  Depth in Epidural Space (cm):  5  Events: no blood with aspiration, no cerebrospinal fluid with aspiration, no paresthesia and negative aspiration test    Test Dose:  Negative    Assessment:   Catheter Secured:  Tegaderm and tape  Insertion:  Uncomplicated  Patient tolerance:  Patient tolerated the procedure well with no immediate complications

## 2019-04-18 NOTE — ANESTHESIA PREPROCEDURE EVALUATION
Relevant Problems No relevant active problems Anesthetic History No history of anesthetic complications Review of Systems / Medical History Patient summary reviewed, nursing notes reviewed and pertinent labs reviewed Pulmonary Within defined limits Neuro/Psych  
 
seizures Cardiovascular Exercise tolerance: >4 METS 
  
GI/Hepatic/Renal 
Within defined limits Endo/Other Within defined limits Other Findings Physical Exam 
 
Airway Mallampati: II 
TM Distance: 4 - 6 cm Neck ROM: normal range of motion Mouth opening: Normal 
 
 Cardiovascular Regular rate and rhythm,  S1 and S2 normal,  no murmur, click, rub, or gallop Dental 
No notable dental hx Pulmonary Breath sounds clear to auscultation Abdominal 
GI exam deferred Other Findings Anesthetic Plan ASA: 2 Anesthesia type: epidural 
Risk and benefits fully explained to the patient including bleeding, headache, nerve damage, infection, nausea, back pain, and hemodynamic changes. Patient understands and agrees to the procedure. Post-op pain plan if not by surgeon: indwelling epidural catheter Anesthetic plan and risks discussed with: Patient

## 2019-04-18 NOTE — LACTATION NOTE
Infant latched and nursing well. Mom educated on breastfeeding basics--hunger cues, feeding on demand, waking baby if baby sleeps too long between feeds, importance of skin to skin, positioning and latching, risk of pacifier use and supplemental feedings, and importance of rooming in--and use of log sheet. Mom also educated on benefits of breastfeeding for herself and baby. Mom verbalized understanding. No questions at this time. 509 Jesús Leos and nursing well at [phone].

## 2019-04-18 NOTE — PROGRESS NOTES
Bedside and Verbal shift change report given to AMPARO Davidson RN (oncoming nurse) by REDD Quintero RN (offgoing nurse). Report included the following information SBAR, Kardex, Procedure Summary, Intake/Output, MAR and Recent Results. 0710  Gisselle-care and clean pads provided. Linen changed 0805 Pt ambulated to bathroom to void. Unable to catch urine. Gisselle-care, clean pads and gown provided. Pt returned to bed in semi-zamarripa's position. 0920 Pt ambulated to bathroom to void. Gisselle-care and clean pads provided. 0934 Pt continues to be tachycardic. Dr. Alannah Frey paged. 2219 Return call from Dr. Alannah Frey. Informed her of tachycardia, blood loss, BP and WBC, 2nd bag of Pitocin administering. No further orders received. Pt ok to transfer to postpartum at this time. 9139 TRANSFER - OUT REPORT: 
 
Verbal report given to JAI Flannery RN(name) on [facility]  being transferred to postpartum(unit) for routine progression of care Report consisted of patients Situation, Background, Assessment and  
Recommendations(SBAR). Information from the following report(s) SBAR, Kardex, Procedure Summary, Intake/Output, MAR and Recent Results was reviewed with the receiving nurse. Lines:  
Peripheral IV 04/16/19 Left;Posterior Forearm (Active) Site Assessment Clean, dry, & intact 4/18/2019  7:17 AM  
Phlebitis Assessment 0 4/18/2019  7:17 AM  
Infiltration Assessment 0 4/18/2019  7:17 AM  
Dressing Status Clean, dry, & intact 4/18/2019  7:17 AM  
Dressing Type Tape;Transparent 4/17/2019  7:15 PM  
Hub Color/Line Status Pink; Infusing 4/18/2019  7:17 AM  
Action Taken Open ports on tubing capped 4/17/2019  7:15 PM  
Alcohol Cap Used Yes 4/18/2019  7:17 AM  
  
 
Opportunity for questions and clarification was provided. Patient transported with: 
 Registered Nurse

## 2019-04-18 NOTE — PROGRESS NOTES
1220 Received care of pt, call bell in reach, no changes in assessment bonding w/infant, OOB for pericare, no excessive lochia or clots 1700 BEDSIDE_VERBAL_RECORDED_WRITTEN: shift change report given to LISSETT Del Cid rn (oncoming nurse) by maricruz Lewis (offgoing nurse). Report given with SBAR, Kardex and MAR.

## 2019-04-18 NOTE — PROGRESS NOTES
Vaginal Delivery Procedure Note Name: Jacobo Medeiros Medical Record Number: [id number] YOB: 2000 Today's Date: 2019 Procedure: VAGINAL DELIVERY Anesthesia: yes Type - 1% xylocaine local:no  Epidural: yes Extra Procedure Details:   
  
Estimated Blood Loss: 450 ccs Fetal Description:  female Anterior shoulder: left Episiotomy: no  
Tear: yes, left vaginal-repaired Cord Blood Results:  
Information for the patient's :  Princess Jadyn Neal, Reta Renee [[id number]] No components found for: ABG Apgars: 7/9 Birth Information:  
Information for the patient's :  Princess Jadyn Neal, Reta Renee [[id number]] Placenta: delivered spontaneously at 0650, appears intact, 3 vessel cord Specimens: Placenta was not sent Complications:  none Birth Weight: 8-12 Mother's Condition: good Baby's Condition: good I was present for the delivery. Signed: Ivet Patricia CNM 2019

## 2019-04-19 PROBLEM — D62 ANEMIA ASSOCIATED WITH ACUTE BLOOD LOSS: Status: ACTIVE | Noted: 2019-04-19

## 2019-04-19 LAB
HCT VFR BLD AUTO: 26.7 % (ref 35–45)
HGB BLD-MCNC: 8.7 G/DL (ref 12–16)

## 2019-04-19 PROCEDURE — 65270000029 HC RM PRIVATE

## 2019-04-19 PROCEDURE — 85014 HEMATOCRIT: CPT

## 2019-04-19 PROCEDURE — 36415 COLL VENOUS BLD VENIPUNCTURE: CPT

## 2019-04-19 PROCEDURE — 74011250637 HC RX REV CODE- 250/637: Performed by: ADVANCED PRACTICE MIDWIFE

## 2019-04-19 PROCEDURE — 74011250637 HC RX REV CODE- 250/637: Performed by: OBSTETRICS & GYNECOLOGY

## 2019-04-19 PROCEDURE — 85018 HEMOGLOBIN: CPT

## 2019-04-19 RX ORDER — DOCUSATE SODIUM 100 MG/1
200 CAPSULE, LIQUID FILLED ORAL
Status: DISCONTINUED | OUTPATIENT
Start: 2019-04-19 | End: 2019-04-20 | Stop reason: HOSPADM

## 2019-04-19 RX ORDER — LANOLIN ALCOHOL/MO/W.PET/CERES
1 CREAM (GRAM) TOPICAL
Status: DISCONTINUED | OUTPATIENT
Start: 2019-04-19 | End: 2019-04-20 | Stop reason: HOSPADM

## 2019-04-19 RX ADMIN — IBUPROFEN 800 MG: 400 TABLET, FILM COATED ORAL at 03:20

## 2019-04-19 RX ADMIN — OXYCODONE HYDROCHLORIDE AND ACETAMINOPHEN 1 TABLET: 5; 325 TABLET ORAL at 20:28

## 2019-04-19 RX ADMIN — FERROUS SULFATE TAB 325 MG (65 MG ELEMENTAL FE) 325 MG: 325 (65 FE) TAB at 12:40

## 2019-04-19 RX ADMIN — IBUPROFEN 800 MG: 400 TABLET, FILM COATED ORAL at 20:28

## 2019-04-19 RX ADMIN — FERROUS SULFATE TAB 325 MG (65 MG ELEMENTAL FE) 325 MG: 325 (65 FE) TAB at 18:46

## 2019-04-19 RX ADMIN — OXYCODONE HYDROCHLORIDE AND ACETAMINOPHEN 1 TABLET: 5; 325 TABLET ORAL at 12:40

## 2019-04-19 NOTE — LACTATION NOTE
Per mom, infant latching and nursing well. Discussed supply and demand as mom supplemented last night. Will page for feeds.

## 2019-04-19 NOTE — PROGRESS NOTES
Bedside and Verbal shift change report given to REDD Arvizu  (oncoming nurse) by Brenda Luu. Reyna Timmons (offgoing nurse). Report included the following information SBAR, Kardex, Intake/Output, MAR and Recent Results. Patient sleeping at this time, NAD noted. SO at the bedside. Infant sleeping in bassinet.

## 2019-04-19 NOTE — ANESTHESIA POSTPROCEDURE EVALUATION
4/19/2019 
6:36 PM 
 
Laboring Epidural Follow-up Note Referring physician: Bernard Gray, * Patient status post vaginal delivery with labor epidural 
 
Visit Vitals /65 (BP 1 Location: Right arm, BP Patient Position: Sitting) Pulse 88 Temp 37 °C (98.6 °F) Resp 18 LMP 07/06/2018 (Approximate) SpO2 97% Breastfeeding? Unknown Epidural removed by L&D staff No sedation, pruritis noted. Adequate analgesia. No obvious anesthesia complications French Liner, CRNA

## 2019-04-19 NOTE — PROGRESS NOTES
Bedside and Verbal shift change report given to SHELIA Napier (oncoming nurse) by REDD Noonan (offgoing nurse). Report included the following information SBAR, Kardex, Intake/Output, MAR and Recent Results.

## 2019-04-20 VITALS
DIASTOLIC BLOOD PRESSURE: 85 MMHG | SYSTOLIC BLOOD PRESSURE: 131 MMHG | OXYGEN SATURATION: 100 % | RESPIRATION RATE: 18 BRPM | HEART RATE: 89 BPM | TEMPERATURE: 98.6 F

## 2019-04-20 PROCEDURE — 74011250637 HC RX REV CODE- 250/637: Performed by: OBSTETRICS & GYNECOLOGY

## 2019-04-20 RX ORDER — CLINDAMYCIN HYDROCHLORIDE 300 MG/1
300 CAPSULE ORAL 2 TIMES DAILY
Qty: 14 CAP | Refills: 0 | Status: SHIPPED | OUTPATIENT
Start: 2019-04-20 | End: 2019-11-24

## 2019-04-20 RX ADMIN — FERROUS SULFATE TAB 325 MG (65 MG ELEMENTAL FE) 325 MG: 325 (65 FE) TAB at 13:20

## 2019-04-20 RX ADMIN — FERROUS SULFATE TAB 325 MG (65 MG ELEMENTAL FE) 325 MG: 325 (65 FE) TAB at 09:38

## 2019-04-20 NOTE — PROGRESS NOTES
Progress Note Patient: Mitzi Major MRN: [id number]  SSN: [id number] YOB: 2000  Age: 22 y.o. Sex: female ROOM:  [facility]/01 Subjective:  
 
Postpartum Day: 2 Delivery: vaginal delivery The patient feels well. The patient denies emotional concerns. The baby is well. Baby is feeding via breast.  The patient is ambulating well. The patient  tolerating a normal diet. Flatus has been passed. Objective:  
  
Patient Vitals for the past 24 hrs: 
 BP Temp Pulse Resp SpO2  
04/19/19 2357 119/74 98.3 °F (36.8 °C) (!) 105 18 98 % Lochia:  appropriate Uterine Fundus:   firm Fundus Location:  -3 Incision: DVT Evaluation:  No evidence of DVT seen on physical exam. 
Negative Nupur's sign. No cords or calf tenderness. No significant calf/ankle edema. Lab/Data Review: All lab results for the last 24 hours reviewed. LABS: Recent Results (from the past 48 hour(s)) HEMOGLOBIN Collection Time: 04/19/19  1:18 AM  
Result Value Ref Range HGB 8.7 (L) 12.0 - 16.0 g/dL HEMATOCRIT Collection Time: 04/19/19  1:18 AM  
Result Value Ref Range HCT 26.7 (L) 35.0 - 45.0 % Assessment:  
 
Status post: Doing well postpartum vaginal delivery Plan:  
 
Postpartum care discussed including diet, ambulation, and actvitiy restrictions. Discharge instructions and questions answered. Signed By: Clarke Elizondo MD   
 April 20, 2019

## 2019-04-20 NOTE — DISCHARGE INSTRUCTIONS
POST DELIVERY DISCHARGE INSTRUCTIONS    Name: Mitzi Major  YOB: 2000  Primary Diagnosis: Active Problems:    Normal spontaneous vaginal delivery (2019)      Anemia associated with acute blood loss (2019)        General:     Diet/Diet Restrictions:  Eight 8-ounce glasses of fluid daily (water, juices); avoid excessive caffeine intake. Meals/snacks as desired which are high in fiber and carbohydrates and low in fat and cholesterol. Physical Activity / Restrictions / Safety:     Avoid heavy lifting, no more that 8 lbs. For 2-3 weeks; limit use of stairs to 2 times daily for the first week home. No driving for one week. Avoid intercourse 4-6 weeks, no douching or tampon use. Check with obstetrician before starting or resuming an exercise program.         Discharge Instructions/Special Treatment/Home Care Needs:     Continue prenatal vitamins. Continue to use squirt bottle with warm water on your episiotomy after each bathroom use until bleeding stops. If steri-strips applied to your incision, remove in 7-10 days. Call your doctor for the following:     Fever over 101 degrees by mouth. Vaginal bleeding heavier than a normal menstrual period or clot larger than a golf ball. Red streaks or increased swelling of legs, painful red streaks on your breast.  Painful urination, constipation and increased pain or swelling or discharge with your incision. If you feel extremely anxious or overwhelmed. If you have thoughts of harming yourself and/or your baby. Pain Management:     Pain Management:   Take Acetaminophen (Tylenol) or Ibuprofen (Advil, Motrin), as directed for pain. Use a warm Sitz bath 3 times daily to relieve episiotomy or hemorrhoidal discomfort. Heating pad to  incision as needed. For hemorrhoidal discomfort, use Tucks and Anusol cream as needed and directed. Follow-Up Care:      These are general instructions for a healthy lifestyle:    No smoking/ No tobacco products/ Avoid exposure to second hand smoke    Surgeon General's Warning:  Quitting smoking now greatly reduces serious risk to your health. Obesity, smoking, and sedentary lifestyle greatly increases your risk for illness    A healthy diet, regular physical exercise & weight monitoring are important for maintaining a healthy lifestyle    Recognize signs and symptoms of STROKE:    F-face looks uneven    A-arms unable to move or move unevenly    S-speech slurred or non-existent    T-time-call 911 as soon as signs and symptoms begin-DO NOT go       Back to bed or wait to see if you get better-TIME IS BRAIN. Patient armband removed and given to patient to take home.   Patient was informed of the privacy risks if armband lost or stolen

## 2019-04-20 NOTE — PROGRESS NOTES
Post-Partum Day Number 1 Progress Note Keith Turk Assessment:  
Hospital Problems  Date Reviewed: 2019 Codes Class Noted POA Anemia associated with acute blood loss ICD-10-CM: D62 
ICD-9-CM: 285.1  2019 No  
   
 Normal spontaneous vaginal delivery ICD-10-CM: O80 
ICD-9-CM: 650  2019 No  
   
  
 
Doing well, post partum day 1 Plan: 1. Continue routine postpartum and perineal care as well as maternal education. 2. Discharge 19 3. This note is a late entry from 19. Information for the patient's :  Dorena Dural Girl, Peter Boubacar [[id number]] Vaginal, Spontaneous Patient doing well without significant complaint. Voiding without difficulty, normal lochia. Current Facility-Administered Medications Medication Dose Route Frequency  ferrous sulfate tablet 325 mg  1 Tab Oral TID WITH MEALS  docusate sodium (COLACE) capsule 200 mg  200 mg Oral QHS  ibuprofen (MOTRIN) tablet 800 mg  800 mg Oral Q8H Vitals: 
Visit Vitals /74 (BP 1 Location: Right arm, BP Patient Position: Sitting) Pulse (!) 105 Temp 98.3 °F (36.8 °C) Resp 18 LMP 2018 (Approximate) SpO2 98% Breastfeeding? Unknown Temp (24hrs), Av.5 °F (36.9 °C), Min:98.3 °F (36.8 °C), Max:98.6 °F (37 °C) Exam:   Patient without distress. Abdomen soft, fundus firm, nontender Perineum with normal lochia noted. Lower extremities are negative for swelling, cords or tenderness. Labs:  
 
Lab Results Component Value Date/Time  WBC 16.1 (H) 2019 10:49 PM  
 WBC 16.6 (H) 2019 04:37 PM  
 WBC 19.6 (H) 2019 04:20 PM  
 HGB 8.7 (L) 2019 01:18 AM  
 HGB 12.3 2019 10:49 PM  
 HGB 11.8 2019 04:37 PM  
 HCT 26.7 (L) 2019 01:18 AM  
 HCT 37.0 2019 10:49 PM  
 HCT 34.5 2019 04:37 PM  
 PLATELET 409  10:49 PM  
 PLATELET 618  04:37 PM  
 PLATELET 115 70/57/4653 04:20 PM  
 
 
No results found for this or any previous visit (from the past 24 hour(s)).

## 2019-04-20 NOTE — PROGRESS NOTES
0730 Received handoff report from SHELIA Teran RN via [employer]. Patient in stable condition. Identification bands verified. Currently resting in room. No further needs reported at this time. Will continue to monitor frequently. 1333 Patient discharged via wheelchair per protocol. Patient in stable condition. Discharged education reviewed and packet given to patient. Patient verbalizes understanding of discharge instructions. E-sign completed. Armbands removed and given to patient. No further needs or questions reported at this time.

## 2019-04-20 NOTE — LACTATION NOTE
Breastfeeding discharge teaching completed to include feeding on demand, foremilk and hindmilk importance, engorgement, mastitis, clogged ducts, pumping, breastmilk storage, and returning to work. Information given about unit and office phone numbers and encouraged mom to reach out if concerns arise, but that [facility] would be calling her in the next few days to follow up on breastfeeding. Mom verbalized understanding and no questions at this time.

## 2019-04-23 NOTE — DISCHARGE SUMMARY
Obstetrical Discharge Summary     Name: Dalia Blanco MRN: [id number]  SSN: [id number]    YOB: 2000  Age: 22 y.o. Sex: female      Admit Date: 2019    Discharge Date: 2019    Admitting Physician: Mariana Gonzalez MD     Attending Physician:  Brooke att. providers found     Discharge Diagnoses:   Information for the patient's :  Mike Xiong [[id number]]   Delivery of a 8 lb 11.5 oz (3.955 kg) female infant via Vaginal, Spontaneous on 2019 at 6:46 AM  by . Apgars were 7 and 9. Additional Diagnoses:   Problem List as of 2019 Date Reviewed: 2019          Codes Class Noted - Resolved    Maternal varicella, non-immune ICD-10-CM: O09.899, Z28.3  ICD-9-CM: V49.89  3/18/2019 - Present        Elevated TSH ICD-10-CM: R79.89  ICD-9-CM: 794.5  3/18/2019 - Present    Overview Addendum 3/20/2019 12:49 PM by Darrell Rodriguez NP     TFT's drawn 3/18/19 - NL             Positive urine drug screen ICD-10-CM: R82.5  ICD-9-CM: 796.0  3/18/2019 - Present    Overview Signed 3/18/2019  5:54 PM by Darrell Rodriguez NP     MJ - pt denies use             Anemia associated with acute blood loss ICD-10-CM: D62  ICD-9-CM: 285.1  2019 - Present        Normal spontaneous vaginal delivery ICD-10-CM: O80  ICD-9-CM: 650  2019 - Present        * (Principal) RESOLVED: Decreased fetal movements in third trimester ICD-10-CM: O36.8130  ICD-9-CM: 655.73  2019 - 2019        RESOLVED: IUP (intrauterine pregnancy), incidental ICD-10-CM: Z34.90  ICD-9-CM: V22.2  3/25/2019 - 2019        RESOLVED: 40 weeks gestation of pregnancy ICD-10-CM: Z3A.40  ICD-9-CM: V22.2  3/25/2019 - 2019        RESOLVED: Post-term pregnancy, 40-42 weeks of gestation ICD-10-CM: O48.0  ICD-9-CM: 645.10  3/16/2019 - 2019    Overview Addendum 4/15/2019  3:38 PM by Lashae Friend MD     3/15/19 EFW 38%, 5 lb. 15 oz. Female fetus.   \"Sandi\"             RESOLVED: Group B Streptococcus urinary tract infection affecting pregnancy in third trimester ICD-10-CM: O23.43, B95.1  ICD-9-CM: 646.63, 599.0, 041.02  3/13/2019 - 4/18/2019    Overview Addendum 3/18/2019  5:51 PM by Nathanael Farias NP     Allergy to PCN; resistant to [place]. Will treat with Vanc. Lab Results   Component Value Date/Time    Rubella, External Immnue 01/01/2019    GrBStrep, External POSITIVE 03/11/2019     No results for input(s): HGB, HGBEXT in the last 72 hours. Hospital Course: Normal hospital course following the delivery. Patient Instructions:   Cannot display discharge medications since this patient is not currently admitted. Reference my discharge instructions.     Follow-up Appointments   Procedures    FOLLOW UP VISIT Appointment in: 6 Weeks     Standing Status:   Standing     Number of Occurrences:   1     Order Specific Question:   Appointment in     Answer:   6 Weeks        Signed By:  Mario Zaragoza MD     April 23, 2019                       BST

## 2019-08-05 ENCOUNTER — HOSPITAL ENCOUNTER (EMERGENCY)
Age: 19
Discharge: HOME | End: 2019-08-05
Attending: EMERGENCY MEDICINE
Payer: COMMERCIAL

## 2019-08-05 VITALS
BODY MASS INDEX: 38.57 KG/M2 | SYSTOLIC BLOOD PRESSURE: 138 MMHG | DIASTOLIC BLOOD PRESSURE: 83 MMHG | HEART RATE: 97 BPM | TEMPERATURE: 98.7 F | HEIGHT: 66 IN | OXYGEN SATURATION: 100 % | WEIGHT: 240 LBS | RESPIRATION RATE: 18 BRPM

## 2019-08-05 DIAGNOSIS — L03.90 CELLULITIS, UNSPECIFIED CELLULITIS SITE: Primary | ICD-10-CM

## 2019-08-05 LAB — HCG UR QL: NEGATIVE

## 2019-08-05 PROCEDURE — 99283 EMERGENCY DEPT VISIT LOW MDM: CPT

## 2019-08-05 PROCEDURE — 81025 URINE PREGNANCY TEST: CPT

## 2019-08-05 RX ORDER — DOXYCYCLINE HYCLATE 100 MG
100 TABLET ORAL 2 TIMES DAILY
Qty: 20 TAB | Refills: 0 | Status: SHIPPED | OUTPATIENT
Start: 2019-08-05 | End: 2019-08-15

## 2019-08-05 NOTE — ED NOTES
Reviewed discharge instructions and medications with patient. Patient verbalized understanding of instructions. All questions answered    Armband removed and shredded.  Patient ambulatory to exit with family member

## 2019-08-05 NOTE — ED NOTES
Patient is ambulatory to ED bed 14 with c/o a \"bacterial skin infection. \" Patient reports [place] from the beach [place]th and noticed the open sores on the LLE on the 24th. Sores are not bleeding or weeping upon inspection. Patient also c/o nausea but denies vomiting.

## 2019-08-05 NOTE — DISCHARGE INSTRUCTIONS

## 2019-08-05 NOTE — ED PROVIDER NOTES
EMERGENCY DEPARTMENT HISTORY AND PHYSICAL EXAM    Date: 8/5/2019  Patient Name: Thaddeus Hahn    History of Presenting Illness     Chief Complaint   Patient presents with    Skin Infection         History Provided By: Patient    Chief Complaint: rash       Additional History (Context):   7:39 PM  Thaddeus Hahn is a 22 y.o. female presents to the emergency department C/O rash to the posterior left thigh and medial left thigh with a small area to the left forearm since 7/24. States they were draining. She states they were swimming in water with \"high bacterial levels\" prior to the spots appearing. She denies any other medical problems specifically diabetes. No fevers. PCP: None    Current Outpatient Medications   Medication Sig Dispense Refill    doxycycline (VIBRA-TABS) 100 mg tablet Take 1 Tab by mouth two (2) times a day for 10 days. 20 Tab 0    clindamycin (CLEOCIN) 300 mg capsule Take 1 Cap by mouth two (2) times a day. 14 Cap 0    Omega 3-U27-VX-W5-Jnfrhxrhwsm 500 mg-500 mcg -1 mg-12.5 mg cap Take 1 Tab by mouth.  PNV66-Iron Fumarate-FA-DSS-DHA 26-1.2- mg cap Take  by mouth. Past History     Past Medical History:  Past Medical History:   Diagnosis Date    Anemia associated with acute blood loss 4/19/2019    Epilepsy ([address]     Migraines     PTSD (post-traumatic stress disorder)     STD (female) 2018    GC/CT    Trauma        Past Surgical History:  Past Surgical History:   Procedure Laterality Date    HX HERNIA REPAIR      HX OTHER SURGICAL         Family History:  Family History   Problem Relation Age of Onset    Bleeding Prob Mother     Thyroid Disease Mother        Social History:  Social History     Tobacco Use    Smoking status: Never Smoker    Smokeless tobacco: Never Used   Substance Use Topics    Alcohol use: No     Frequency: Never    Drug use: No       Allergies:   Allergies   Allergen Reactions    Pcn [Penicillins] Hives    Sulfa (Sulfonamide Antibiotics) Hives       Review of Systems   Review of Systems   Constitutional: Negative for chills and fever. Gastrointestinal: Negative for diarrhea, nausea and vomiting. Skin: Positive for rash and wound. Neurological: Negative for weakness and numbness. All other systems reviewed and are negative. Physical Exam     Vitals:    08/05/19 1816   BP: 138/83   Pulse: 97   Resp: 18   Temp: 98.7 °F (37.1 °C)   SpO2: 100%   Weight: 108.9 kg (240 lb)   Height: 5' 6\" (1.676 m)     Physical Exam   Musculoskeletal:        Arms:       Legs:          Diagnostic Study Results     Labs:     Recent Results (from the past 12 hour(s))   HCG URINE, QL. - POC    Collection Time: 08/05/19  6:57 PM   Result Value Ref Range    Pregnancy test,urine (POC) NEGATIVE  NEG         Radiologic Studies:   No orders to display     CT Results  (Last 48 hours)    None        CXR Results  (Last 48 hours)    None          Medical Decision Making   I am the first provider for this patient. I reviewed the vital signs, available nursing notes, past medical history, past surgical history, family history and social history. Vital Signs: Reviewed the patient's vital signs. Pulse Oximetry Analysis: 100% on RA       Records Reviewed: Nursing Notes and Old Medical Records    Procedures:  Procedures    ED Course:   6:30 PM Initial assessment performed. The patients presenting problems have been discussed, and they are in agreement with the care plan formulated and outlined with them. I have encouraged them to ask questions as they arise throughout their visit. FACE-TO-FACE PROGRESS NOTE:  6:53 PM  The patient presents with rash. My exam shows rash consistent cellulitis. Imp/plan: continue with [address] [place] [address] and put the patient on doxy. I have personally seen and examined the patient, reviewed the TANESHA's note and agree with findings and plan.   Written by Nick Monroy MD      Discussion:  Pt presents with few superficial areas, to to the leg and one to the forearm that appear consistent with cellulitis. No drainage no fluctuance. She is afebrile and nontoxic. Will treat with doxycycline. Strict return precautions given, pt offering no questions or complaints. Diagnosis and Disposition     DISCHARGE NOTE:    Janie Walker  results have been reviewed with her. She has been counseled regarding her diagnosis, treatment, and plan. She verbally conveys understanding and agreement of the signs, symptoms, diagnosis, treatment and prognosis and additionally agrees to follow up as discussed. She also agrees with the care-plan and conveys that all of her questions have been answered. I have also provided discharge instructions for her that include: educational information regarding their diagnosis and treatment, and list of reasons why they would want to return to the ED prior to their follow-up appointment, should her condition change. She has been provided with education for proper emergency department utilization. CLINICAL IMPRESSION:    1. Cellulitis, unspecified cellulitis site        PLAN:  1. D/C Home  2. Discharge Medication List as of 8/5/2019  7:02 PM      START taking these medications    Details   doxycycline (VIBRA-TABS) 100 mg tablet Take 1 Tab by mouth two (2) times a day for 10 days. , Print, Disp-20 Tab, R-0         CONTINUE these medications which have NOT CHANGED    Details   clindamycin (CLEOCIN) 300 mg capsule Take 1 Cap by mouth two (2) times a day., Print, Disp-14 Cap, R-0      Omega 3-J65-UE-H8-Rzyotasuwjc 500 mg-500 mcg -1 mg-12.5 mg cap Take 1 Tab by mouth., Historical Med      PNV66-Iron Fumarate-FA-DSS-DHA 26-1.2- mg cap Take  by mouth., Historical Med           3.    Follow-up Information     Follow up With Specialties Details Why Contact Info    [address] [facility]   call for  follow up and recheck  [address] [facility], [address] [facility] [address]    [facility] EMERGENCY DEPT Emergency Medicine  If symptoms worsen [address] [place]  [phone]    Bruce Calderon MD Dermatology  call for follow up and recheck  [address]  [phone]                   Please note that this dictation was completed with [email or web address], the computer voice recognition software. Quite often unanticipated grammatical, syntax, homophones, and other interpretive errors are inadvertently transcribed by the computer software. Please disregard these errors. Please excuse any errors that have escaped final proofreading.

## 2019-11-24 ENCOUNTER — HOSPITAL ENCOUNTER (EMERGENCY)
Age: 19
Discharge: HOME | End: 2019-11-24
Attending: EMERGENCY MEDICINE
Payer: COMMERCIAL

## 2019-11-24 VITALS
TEMPERATURE: 97.4 F | BODY MASS INDEX: 37.83 KG/M2 | DIASTOLIC BLOOD PRESSURE: 84 MMHG | WEIGHT: 241 LBS | HEIGHT: 67 IN | OXYGEN SATURATION: 100 % | SYSTOLIC BLOOD PRESSURE: 124 MMHG | RESPIRATION RATE: 18 BRPM | HEART RATE: 74 BPM

## 2019-11-24 DIAGNOSIS — M54.50 MIDLINE LOW BACK PAIN WITHOUT SCIATICA, UNSPECIFIED CHRONICITY: Primary | ICD-10-CM

## 2019-11-24 DIAGNOSIS — M54.2 NECK PAIN: ICD-10-CM

## 2019-11-24 LAB
APPEARANCE UR: CLEAR
BILIRUB UR QL: NEGATIVE
COLOR UR: YELLOW
GLUCOSE UR STRIP.AUTO-MCNC: NEGATIVE MG/DL
HCG UR QL: NEGATIVE
HGB UR QL STRIP: NEGATIVE
KETONES UR QL STRIP.AUTO: NEGATIVE MG/DL
LEUKOCYTE ESTERASE UR QL STRIP.AUTO: NEGATIVE
NITRITE UR QL STRIP.AUTO: NEGATIVE
PH UR STRIP: 6.5 [PH] (ref 5–8)
PROT UR STRIP-MCNC: NEGATIVE MG/DL
SP GR UR REFRACTOMETRY: 1.01 (ref 1–1.03)
UROBILINOGEN UR QL STRIP.AUTO: 0.2 EU/DL (ref 0.2–1)

## 2019-11-24 PROCEDURE — 74011250637 HC RX REV CODE- 250/637: Performed by: EMERGENCY MEDICINE

## 2019-11-24 PROCEDURE — 99284 EMERGENCY DEPT VISIT MOD MDM: CPT

## 2019-11-24 PROCEDURE — 74011636637 HC RX REV CODE- 636/637: Performed by: EMERGENCY MEDICINE

## 2019-11-24 PROCEDURE — 81003 URINALYSIS AUTO W/O SCOPE: CPT

## 2019-11-24 PROCEDURE — 81025 URINE PREGNANCY TEST: CPT

## 2019-11-24 PROCEDURE — 96372 THER/PROPH/DIAG INJ SC/IM: CPT

## 2019-11-24 PROCEDURE — 74011250636 HC RX REV CODE- 250/636: Performed by: EMERGENCY MEDICINE

## 2019-11-24 RX ORDER — KETOROLAC TROMETHAMINE 30 MG/ML
60 INJECTION, SOLUTION INTRAMUSCULAR; INTRAVENOUS
Status: COMPLETED | OUTPATIENT
Start: 2019-11-24 | End: 2019-11-24

## 2019-11-24 RX ORDER — TIZANIDINE HYDROCHLORIDE 2 MG/1
2 CAPSULE, GELATIN COATED ORAL
Qty: 15 CAP | Refills: 0 | Status: SHIPPED | OUTPATIENT
Start: 2019-11-24 | End: 2021-11-12

## 2019-11-24 RX ORDER — PREDNISONE 20 MG/1
TABLET ORAL
Qty: 21 TAB | Refills: 0 | Status: SHIPPED | OUTPATIENT
Start: 2019-11-24 | End: 2021-11-12

## 2019-11-24 RX ORDER — PREDNISONE 20 MG/1
60 TABLET ORAL ONCE
Status: COMPLETED | OUTPATIENT
Start: 2019-11-24 | End: 2019-11-24

## 2019-11-24 RX ORDER — TIZANIDINE 2 MG/1
2 TABLET ORAL
Status: COMPLETED | OUTPATIENT
Start: 2019-11-24 | End: 2019-11-24

## 2019-11-24 RX ADMIN — KETOROLAC TROMETHAMINE 60 MG: 30 INJECTION, SOLUTION INTRAMUSCULAR at 02:18

## 2019-11-24 RX ADMIN — PREDNISONE 60 MG: 20 TABLET ORAL at 02:18

## 2019-11-24 RX ADMIN — TIZANIDINE 2 MG: 2 TABLET ORAL at 02:18

## 2019-11-24 NOTE — ED NOTES
Pt discharged home stable and ambulatory. Pain level at discharge 6/10. Pt discharged with . Reviewed discharged instructions with pt who verbalized understanding.   Patient armband removed and shredded  No s/s of med rxn at discharge

## 2019-11-24 NOTE — ED TRIAGE NOTES
Pt presents to ED after being dropped off by spouse, pt ambulates to triage slowly after texting on her phone. Pt c/o back pain, possible UTI, possible diabetes, recent difficulty concentrating. Pt denies pain with urination but admits to frequency, states she looked it up online. Pt has food container and large beverage with her in lobby. Pt in NAD and able to make needs known.
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
## 2020-08-07 PROBLEM — D62 ANEMIA ASSOCIATED WITH ACUTE BLOOD LOSS: Status: RESOLVED | Noted: 2019-04-19 | Resolved: 2020-08-07

## 2020-08-07 PROBLEM — Z28.39 MATERNAL VARICELLA, NON-IMMUNE: Status: RESOLVED | Noted: 2019-03-18 | Resolved: 2020-08-07

## 2020-08-07 PROBLEM — O09.899 MATERNAL VARICELLA, NON-IMMUNE: Status: RESOLVED | Noted: 2019-03-18 | Resolved: 2020-08-07

## 2021-04-11 ENCOUNTER — HOSPITAL ENCOUNTER (EMERGENCY)
Age: 21
Discharge: HOME | End: 2021-04-12
Attending: EMERGENCY MEDICINE
Payer: MEDICAID

## 2021-04-11 ENCOUNTER — APPOINTMENT (OUTPATIENT)
Dept: CT IMAGING | Age: 21
End: 2021-04-11
Attending: EMERGENCY MEDICINE
Payer: MEDICAID

## 2021-04-11 DIAGNOSIS — N20.0 KIDNEY STONE: ICD-10-CM

## 2021-04-11 DIAGNOSIS — R10.9 LEFT SIDED ABDOMINAL PAIN: Primary | ICD-10-CM

## 2021-04-11 DIAGNOSIS — M51.26 HERNIATED LUMBAR INTERVERTEBRAL DISC: ICD-10-CM

## 2021-04-11 DIAGNOSIS — M54.32 SCIATICA OF LEFT SIDE: ICD-10-CM

## 2021-04-11 LAB
ALBUMIN SERPL-MCNC: 4.1 G/DL (ref 3.4–5)
ALBUMIN/GLOB SERPL: 1 {RATIO} (ref 0.8–1.7)
ALP SERPL-CCNC: 110 U/L (ref 45–117)
ALT SERPL-CCNC: 30 U/L (ref 13–56)
ANION GAP SERPL CALC-SCNC: 8 MMOL/L (ref 3–18)
APPEARANCE UR: CLEAR
AST SERPL-CCNC: 25 U/L (ref 10–38)
BACTERIA URNS QL MICRO: NEGATIVE /HPF
BASOPHILS # BLD: 0.1 K/UL (ref 0–0.1)
BASOPHILS NFR BLD: 0 % (ref 0–2)
BILIRUB SERPL-MCNC: 0.6 MG/DL (ref 0.2–1)
BILIRUB UR QL: NEGATIVE
BUN SERPL-MCNC: 10 MG/DL (ref 7–18)
BUN/CREAT SERPL: 14 (ref 12–20)
CALCIUM SERPL-MCNC: 9.7 MG/DL (ref 8.5–10.1)
CHLORIDE SERPL-SCNC: 107 MMOL/L (ref 100–111)
CO2 SERPL-SCNC: 26 MMOL/L (ref 21–32)
COLOR UR: YELLOW
CREAT SERPL-MCNC: 0.72 MG/DL (ref 0.6–1.3)
DIFFERENTIAL METHOD BLD: ABNORMAL
EOSINOPHIL # BLD: 0.2 K/UL (ref 0–0.4)
EOSINOPHIL NFR BLD: 2 % (ref 0–5)
EPITH CASTS URNS QL MICRO: NORMAL /LPF (ref 0–5)
ERYTHROCYTE [DISTWIDTH] IN BLOOD BY AUTOMATED COUNT: 12.1 % (ref 11.6–14.5)
GLOBULIN SER CALC-MCNC: 4.2 G/DL (ref 2–4)
GLUCOSE SERPL-MCNC: 85 MG/DL (ref 74–99)
GLUCOSE UR STRIP.AUTO-MCNC: NEGATIVE MG/DL
HCG UR QL: NEGATIVE
HCG UR QL: NEGATIVE
HCT VFR BLD AUTO: 46.5 % (ref 35–45)
HGB BLD-MCNC: 15.3 G/DL (ref 12–16)
HGB UR QL STRIP: ABNORMAL
KETONES UR QL STRIP.AUTO: NEGATIVE MG/DL
LEUKOCYTE ESTERASE UR QL STRIP.AUTO: NEGATIVE
LIPASE SERPL-CCNC: 80 U/L (ref 73–393)
LYMPHOCYTES # BLD: 3.9 K/UL (ref 0.9–3.6)
LYMPHOCYTES NFR BLD: 28 % (ref 21–52)
MCH RBC QN AUTO: 29 PG (ref 24–34)
MCHC RBC AUTO-ENTMCNC: 32.9 G/DL (ref 31–37)
MCV RBC AUTO: 88.2 FL (ref 74–97)
MONOCYTES # BLD: 0.6 K/UL (ref 0.05–1.2)
MONOCYTES NFR BLD: 4 % (ref 3–10)
NEUTS SEG # BLD: 9.4 K/UL (ref 1.8–8)
NEUTS SEG NFR BLD: 66 % (ref 40–73)
NITRITE UR QL STRIP.AUTO: NEGATIVE
PH UR STRIP: 7 [PH] (ref 5–8)
PLATELET # BLD AUTO: 376 K/UL (ref 135–420)
PMV BLD AUTO: 9.6 FL (ref 9.2–11.8)
POTASSIUM SERPL-SCNC: 4 MMOL/L (ref 3.5–5.5)
PROT SERPL-MCNC: 8.3 G/DL (ref 6.4–8.2)
PROT UR STRIP-MCNC: NEGATIVE MG/DL
RBC # BLD AUTO: 5.27 M/UL (ref 4.2–5.3)
RBC #/AREA URNS HPF: NORMAL /HPF (ref 0–5)
SODIUM SERPL-SCNC: 141 MMOL/L (ref 136–145)
SP GR UR REFRACTOMETRY: 1.02 (ref 1–1.03)
UROBILINOGEN UR QL STRIP.AUTO: 1 EU/DL (ref 0.2–1)
WBC # BLD AUTO: 14.2 K/UL (ref 4.6–13.2)
WBC URNS QL MICRO: NORMAL /HPF (ref 0–5)

## 2021-04-11 PROCEDURE — 74011250637 HC RX REV CODE- 250/637: Performed by: EMERGENCY MEDICINE

## 2021-04-11 PROCEDURE — 74177 CT ABD & PELVIS W/CONTRAST: CPT

## 2021-04-11 PROCEDURE — 99285 EMERGENCY DEPT VISIT HI MDM: CPT

## 2021-04-11 PROCEDURE — 74011000636 HC RX REV CODE- 636: Performed by: EMERGENCY MEDICINE

## 2021-04-11 PROCEDURE — 99281 EMR DPT VST MAYX REQ PHY/QHP: CPT

## 2021-04-11 PROCEDURE — 81025 URINE PREGNANCY TEST: CPT

## 2021-04-11 PROCEDURE — 80053 COMPREHEN METABOLIC PANEL: CPT

## 2021-04-11 PROCEDURE — 85025 COMPLETE CBC W/AUTO DIFF WBC: CPT

## 2021-04-11 PROCEDURE — 83690 ASSAY OF LIPASE: CPT

## 2021-04-11 PROCEDURE — 81001 URINALYSIS AUTO W/SCOPE: CPT

## 2021-04-11 PROCEDURE — 74011250636 HC RX REV CODE- 250/636: Performed by: EMERGENCY MEDICINE

## 2021-04-11 PROCEDURE — 96374 THER/PROPH/DIAG INJ IV PUSH: CPT

## 2021-04-11 RX ORDER — DICYCLOMINE HYDROCHLORIDE 10 MG/1
20 CAPSULE ORAL
Status: COMPLETED | OUTPATIENT
Start: 2021-04-11 | End: 2021-04-11

## 2021-04-11 RX ORDER — ONDANSETRON 2 MG/ML
4 INJECTION INTRAMUSCULAR; INTRAVENOUS
Status: COMPLETED | OUTPATIENT
Start: 2021-04-11 | End: 2021-04-11

## 2021-04-11 RX ADMIN — IOPAMIDOL 100 ML: 612 INJECTION, SOLUTION INTRAVENOUS at 22:33

## 2021-04-11 RX ADMIN — SODIUM CHLORIDE 1000 ML: 900 INJECTION, SOLUTION INTRAVENOUS at 20:35

## 2021-04-11 RX ADMIN — DICYCLOMINE HYDROCHLORIDE 20 MG: 10 CAPSULE ORAL at 20:38

## 2021-04-11 RX ADMIN — ONDANSETRON 4 MG: 2 INJECTION INTRAMUSCULAR; INTRAVENOUS at 20:38

## 2021-04-11 NOTE — ED TRIAGE NOTES
Pt in for c/o LLQ abdominal pain x 5 days. Pt reports frequent bowel movements and vomited 3 days ago.

## 2021-04-11 NOTE — ED PROVIDER NOTES
EMERGENCY DEPARTMENT HISTORY AND PHYSICAL EXAM    Date: 4/11/2021  Patient Name: Quincy Fox    History of Presenting Illness     Chief Complaint   Patient presents with    Abdominal Pain     LLQ         History Provided By: Patient    Additional History (Context):   4:30 PM  Quincy Fox is a 19 y.o. female with PMHX of migraines, epilepsy, PTSD, anemia, hernia repair, herniated disc who presents to the emergency department C/O abdominal pain. Patient states symptoms started roughly 4 to 5 days ago seems to wax and wane over on her left hand side. They seem to be worsened by any physical activity and seem to be abated when she is lying still. Food or drink do not seem to aggravate her symptoms. She is having some nausea and vomiting but no diarrhea associated with her symptoms. She denies any urinary symptoms. She denies any blood per rectum. She denies any fevers or chills. She denies any incontinence of bladder or bowel although she says that she sometimes has some problems holding and feeling comfortable when trying to move her bowels (taste seems to be associated with some of her constipation). .    Social History  Denies smoking drinking or drugs however she does vape    Family History  Positive family history of ovarian cyst in her mother  Denies    PCP: Rohit Varner MD    Current Outpatient Medications   Medication Sig Dispense Refill    tamsulosin (Flomax) 0.4 mg capsule Take 1 Cap by mouth daily for 15 days. 15 Cap 0    ibuprofen (MOTRIN) 800 mg tablet Take 1 Tab by mouth every eight (8) hours as needed for Pain for up to 7 days. 20 Tab 0    oxyCODONE-acetaminophen (Percocet) 5-325 mg per tablet Take 1 Tab by mouth every four (4) hours as needed for Pain for up to 7 days. Max Daily Amount: 6 Tabs. Take 1 tablet every 4-6 hours as needed for pain control.   If you were instructed to try over the counter ibuprofen or tylenol, only take the percocet for pain not controlled with the over the counter medication. 12 Tab 0    methocarbamoL (Robaxin) 500 mg tablet Take 2 Tabs by mouth three (3) times daily (after meals). Indications: muscle spasm 30 Tab 0    buPROPion SR (Wellbutrin SR) 100 mg SR tablet Take  by mouth.  medroxyPROGESTERone (DEPO-PROVERA) 150 mg/mL syrg INJECT 1 ML INTRAMUSCULARLY EVERY 3 MONTHS 1 mL 3    predniSONE (DELTASONE) 20 mg tablet Take 3 pills for 2 days, 2.5 pills for 2 days, 2 pills for 2 days, 1.5 pills for 2 days, 1 pills for 2 days, 1/2 pill for 2 days 21 Tab 0    tiZANidine (ZANAFLEX) 2 mg capsule Take 1 Cap by mouth three (3) times daily as needed (back spasm). 15 Cap 0       Past History     Past Medical History:  Past Medical History:   Diagnosis Date    Anemia associated with acute blood loss 4/19/2019    Epilepsy ([address]     Migraines     PTSD (post-traumatic stress disorder)     STD (female) 2018    GC/CT    Trauma        Past Surgical History:  Past Surgical History:   Procedure Laterality Date    HX HERNIA REPAIR      HX OTHER SURGICAL         Family History:  Family History   Problem Relation Age of Onset    Bleeding Prob Mother     Thyroid Disease Mother     Ovarian Cancer Mother         age 43-43    Pancreatic Cancer Mother         age 44-44       Social History:  Social History     Tobacco Use    Smoking status: Never Smoker    Smokeless tobacco: Never Used   Substance Use Topics    Alcohol use: Yes     Frequency: Never    Drug use: No       Allergies: Allergies   Allergen Reactions    Pcn [Penicillins] Hives    Sulfa (Sulfonamide Antibiotics) Hives         Review of Systems   Review of Systems   Constitutional: Negative for chills, diaphoresis and fever. Gastrointestinal: Positive for abdominal pain, nausea and vomiting. Negative for constipation and diarrhea. All other systems reviewed and are negative.     Physical Exam     Vitals:    04/11/21 2330 04/12/21 0000 04/12/21 0030 04/12/21 0100   BP: (!) 144/76 131/79 117/65 (!) 140/79   Pulse:       Resp:       Temp:       SpO2: 97% 100% 96% 95%   Weight:       Height:         Physical Exam  Vitals signs and nursing note reviewed. Constitutional:       General: She is not in acute distress. Appearance: She is well-developed. She is not diaphoretic. HENT:      Head: Normocephalic and atraumatic. Eyes:      General: No scleral icterus. Extraocular Movements:      Right eye: Normal extraocular motion. Left eye: Normal extraocular motion. Conjunctiva/sclera: Conjunctivae normal.      Pupils: Pupils are equal, round, and reactive to light. Neck:      Musculoskeletal: Normal range of motion and neck supple. Trachea: No tracheal deviation. Cardiovascular:      Rate and Rhythm: Normal rate and regular rhythm. Heart sounds: Normal heart sounds. Pulmonary:      Effort: Pulmonary effort is normal. No respiratory distress. Breath sounds: Normal breath sounds. No stridor. Abdominal:      General: Bowel sounds are normal. There is no distension. Palpations: Abdomen is soft. Tenderness: There is abdominal tenderness in the left upper quadrant and left lower quadrant. There is no rebound. Hernia: No hernia is present. There is no hernia in the ventral area or left inguinal area. Musculoskeletal: Normal range of motion. General: No tenderness. Comments: Grossly unremarkable without abnormalities   Skin:     General: Skin is warm and dry. Capillary Refill: Capillary refill takes less than 2 seconds. Findings: No erythema or rash. Neurological:      Mental Status: She is alert and oriented to person, place, and time. GCS: GCS eye subscore is 4. GCS verbal subscore is 5. GCS motor subscore is 6. Cranial Nerves: No cranial nerve deficit. Motor: No weakness. Gait: Gait is intact.    Psychiatric:         Mood and Affect: Mood normal.         Behavior: Behavior normal.         Thought Content: Thought content normal.         Judgment: Judgment normal.       Diagnostic Study Results     Labs -     Recent Results (from the past 12 hour(s))   CBC WITH AUTOMATED DIFF    Collection Time: 04/11/21  6:58 PM   Result Value Ref Range    WBC 14.2 (H) 4.6 - 13.2 K/uL    RBC 5.27 4.20 - 5.30 M/uL    HGB 15.3 12.0 - 16.0 g/dL    HCT 46.5 (H) 35.0 - 45.0 %    MCV 88.2 74.0 - 97.0 FL    MCH 29.0 24.0 - 34.0 PG    MCHC 32.9 31.0 - 37.0 g/dL    RDW 12.1 11.6 - 14.5 %    PLATELET 751 407 - 939 K/uL    MPV 9.6 9.2 - 11.8 FL    NEUTROPHILS 66 40 - 73 %    LYMPHOCYTES 28 21 - 52 %    MONOCYTES 4 3 - 10 %    EOSINOPHILS 2 0 - 5 %    BASOPHILS 0 0 - 2 %    ABS. NEUTROPHILS 9.4 (H) 1.8 - 8.0 K/UL    ABS. LYMPHOCYTES 3.9 (H) 0.9 - 3.6 K/UL    ABS. MONOCYTES 0.6 0.05 - 1.2 K/UL    ABS. EOSINOPHILS 0.2 0.0 - 0.4 K/UL    ABS. BASOPHILS 0.1 0.0 - 0.1 K/UL    DF AUTOMATED     METABOLIC PANEL, COMPREHENSIVE    Collection Time: 04/11/21  6:58 PM   Result Value Ref Range    Sodium 141 136 - 145 mmol/L    Potassium 4.0 3.5 - 5.5 mmol/L    Chloride 107 100 - 111 mmol/L    CO2 26 21 - 32 mmol/L    Anion gap 8 3.0 - 18 mmol/L    Glucose 85 74 - 99 mg/dL    BUN 10 7.0 - 18 MG/DL    Creatinine 0.72 0.6 - 1.3 MG/DL    BUN/Creatinine ratio 14 12 - 20      GFR est AA >60 >60 ml/min/1.73m2    GFR est non-AA >60 >60 ml/min/1.73m2    Calcium 9.7 8.5 - 10.1 MG/DL    Bilirubin, total PENDING MG/DL    ALT (SGPT) 30 13 - 56 U/L    AST (SGOT) 25 10 - 38 U/L    Alk.  phosphatase PENDING U/L    Protein, total PENDING g/dL    Albumin 4.1 3.4 - 5.0 g/dL    Globulin PENDING g/dL    A-G Ratio PENDING     LIPASE    Collection Time: 04/11/21  6:58 PM   Result Value Ref Range    Lipase 80 73 - 393 U/L   URINALYSIS W/ RFLX MICROSCOPIC    Collection Time: 04/11/21  7:00 PM   Result Value Ref Range    Color YELLOW      Appearance CLEAR      Specific gravity 1.019 1.005 - 1.030      pH (UA) 7.0 5.0 - 8.0      Protein Negative NEG mg/dL    Glucose Negative NEG mg/dL Ketone Negative NEG mg/dL    Bilirubin Negative NEG      Blood MODERATE (A) NEG      Urobilinogen 1.0 0.2 - 1.0 EU/dL    Nitrites Negative NEG      Leukocyte Esterase Negative NEG     HCG URINE, QL    Collection Time: 04/11/21  7:00 PM   Result Value Ref Range    HCG urine, QL Negative NEG     URINE MICROSCOPIC ONLY    Collection Time: 04/11/21  7:00 PM   Result Value Ref Range    WBC 0 to 3 0 - 5 /hpf    RBC 31 to 40 0 - 5 /hpf    Epithelial cells 1+ 0 - 5 /lpf    Bacteria Negative NEG /hpf   HCG URINE, QL. - POC    Collection Time: 04/11/21  7:01 PM   Result Value Ref Range    Pregnancy test,urine (POC) Negative NEG         Radiologic Studies -   CT ABD PELV W CONT   Final Result      Nonobstructing calculus lower pole left kidney. Thickened left renal pelvis and left ureter without ureteral calculus. Consider   ascending infection versus recently passed calculus. CT Results  (Last 48 hours)    None        CXR Results  (Last 48 hours)    None          Medications given in the ED-  Medications   dicyclomine (BENTYL) capsule 20 mg (20 mg Oral Given 4/11/21 2038)   ondansetron (ZOFRAN) injection 4 mg (4 mg IntraVENous Given 4/11/21 2038)   sodium chloride 0.9 % bolus infusion 1,000 mL (0 mL IntraVENous IV Completed 4/11/21 2135)   iopamidoL (ISOVUE 300) 61 % contrast injection  mL (100 mL IntraVENous Given 4/11/21 2233)   tamsulosin (FLOMAX) capsule 0.4 mg (0.4 mg Oral Given 4/12/21 0115)         Medical Decision Making   I am the first provider for this patient. I reviewed the vital signs, available nursing notes, past medical history, past surgical history, family history and social history. Vital Signs-Reviewed the patient's vital signs. Pulse Oximetry Analysis -97% on room air    Records Reviewed: NURSING NOTES AND PREVIOUS MEDICAL RECORDS    Provider Notes (Medical Decision Making):    In female status and abdominal pain associated with movement suggesting musculoskeletal cause perhaps associated with her hernia however nausea vomiting implies GI or  reproductive organs. She is not pregnant and blood work is reassuring. CAT scan interestingly did show a nonobstructive stone in the parenchyma but also thickened and distended tissue suggestive of infection (not confirmed on urine) recently passed stone which would be more likely. It is unlikely this represents PID or ovarian torsion or hemorrhage requiring ultrasound. Will treat symptoms and obtain a PCP follow-up. Given pain radiating down the left side of her leg to embolize in a this is currently her symptomatic hernia and not associated with renal disease. She probably needs both that these rest.  She has no evidence of cauda equina or conus medullaris syndrome. Procedures:  Procedures    ED Course:   7:24 PM: Initial assessment performed. The patients presenting problems have been discussed, and they are in agreement with the care plan formulated and outlined with them. I have encouraged them to ask questions as they arise throughout their visit. Diagnosis and Disposition       DISCHARGE NOTE:  0:08 AM  Elsie Amyalisa's  results have been reviewed with her. She has been counseled regarding her diagnosis, treatment, and plan. She verbally conveys understanding and agreement of the signs, symptoms, diagnosis, treatment and prognosis and additionally agrees to follow up as discussed. She also agrees with the care-plan and conveys that all of her questions have been answered. I have also provided discharge instructions for her that include: educational information regarding their diagnosis and treatment, and list of reasons why they would want to return to the ED prior to their follow-up appointment, should her condition change. She has been provided with education for proper emergency department utilization. CLINICAL IMPRESSION:    1. Left sided abdominal pain    2. Kidney stone    3. Sciatica of left side    4. Herniated lumbar intervertebral disc        PLAN:  1. D/C Home  2. Discharge Medication List as of 4/12/2021  1:15 AM      START taking these medications    Details   tamsulosin (Flomax) 0.4 mg capsule Take 1 Cap by mouth daily for 15 days. , Normal, Disp-15 Cap, R-0      ibuprofen (MOTRIN) 800 mg tablet Take 1 Tab by mouth every eight (8) hours as needed for Pain for up to 7 days. , Normal, Disp-20 Tab, R-0      oxyCODONE-acetaminophen (Percocet) 5-325 mg per tablet Take 1 Tab by mouth every four (4) hours as needed for Pain for up to 7 days. Max Daily Amount: 6 Tabs. Take 1 tablet every 4-6 hours as needed for pain control. If you were instructed to try over the counter ibuprofen or tylenol, only take the percocet  for pain not controlled with the over the counter medication. , Normal, Disp-12 Tab, R-0      methocarbamoL (Robaxin) 500 mg tablet Take 2 Tabs by mouth three (3) times daily (after meals). Indications: muscle spasm, Normal, Disp-30 Tab, R-0         CONTINUE these medications which have NOT CHANGED    Details   buPROPion SR (Wellbutrin SR) 100 mg SR tablet Take  by mouth., Historical Med      medroxyPROGESTERone (DEPO-PROVERA) 150 mg/mL syrg INJECT 1 ML INTRAMUSCULARLY EVERY 3 MONTHS, Normal, Disp-1 mL,R-3      predniSONE (DELTASONE) 20 mg tablet Take 3 pills for 2 days, 2.5 pills for 2 days, 2 pills for 2 days, 1.5 pills for 2 days, 1 pills for 2 days, 1/2 pill for 2 days, Normal, Disp-21 Tab, R-0      tiZANidine (ZANAFLEX) 2 mg capsule Take 1 Cap by mouth three (3) times daily as needed (back spasm). , Normal, Disp-15 Cap, R-0           3.    Follow-up Information     Follow up With Specialties Details Why Contact Info    Beatriz Sylvester MD Family Medicine In 1 week  [address] [facility] [address]      Eleazar Toro MD Urology In 1 week  [place] [address] [address] [place] [address]      [facility]  As needed [address] [facility] [address] [phone]        _______________________________    This note was partially transcribed via voice recognition software. Although efforts have been made to catch any discrepancies, it may contain sound alike words, grammatical errors, or nonsensical words.

## 2021-04-12 VITALS
RESPIRATION RATE: 16 BRPM | WEIGHT: 249 LBS | SYSTOLIC BLOOD PRESSURE: 140 MMHG | HEART RATE: 84 BPM | BODY MASS INDEX: 39.08 KG/M2 | OXYGEN SATURATION: 95 % | HEIGHT: 67 IN | DIASTOLIC BLOOD PRESSURE: 79 MMHG | TEMPERATURE: 98 F

## 2021-04-12 PROCEDURE — 74011250637 HC RX REV CODE- 250/637: Performed by: EMERGENCY MEDICINE

## 2021-04-12 RX ORDER — TAMSULOSIN HYDROCHLORIDE 0.4 MG/1
0.4 CAPSULE ORAL DAILY
Qty: 15 CAP | Refills: 0 | Status: SHIPPED | OUTPATIENT
Start: 2021-04-12 | End: 2021-04-27

## 2021-04-12 RX ORDER — OXYCODONE AND ACETAMINOPHEN 5; 325 MG/1; MG/1
1 TABLET ORAL
Qty: 12 TAB | Refills: 0 | Status: SHIPPED | OUTPATIENT
Start: 2021-04-12 | End: 2021-04-19

## 2021-04-12 RX ORDER — TAMSULOSIN HYDROCHLORIDE 0.4 MG/1
0.4 CAPSULE ORAL
Status: COMPLETED | OUTPATIENT
Start: 2021-04-12 | End: 2021-04-12

## 2021-04-12 RX ORDER — METHOCARBAMOL 500 MG/1
1000 TABLET, FILM COATED ORAL
Qty: 30 TAB | Refills: 0 | Status: SHIPPED | OUTPATIENT
Start: 2021-04-12 | End: 2021-11-12

## 2021-04-12 RX ORDER — IBUPROFEN 800 MG/1
800 TABLET ORAL
Qty: 20 TAB | Refills: 0 | Status: SHIPPED | OUTPATIENT
Start: 2021-04-12 | End: 2021-04-19

## 2021-04-12 RX ADMIN — TAMSULOSIN HYDROCHLORIDE 0.4 MG: 0.4 CAPSULE ORAL at 01:15

## 2021-11-10 ENCOUNTER — APPOINTMENT (OUTPATIENT)
Dept: GENERAL RADIOLOGY | Age: 21
DRG: 463 | End: 2021-11-10
Attending: PHYSICIAN ASSISTANT
Payer: MEDICAID

## 2021-11-10 ENCOUNTER — HOSPITAL ENCOUNTER (INPATIENT)
Age: 21
LOS: 2 days | Discharge: HOME | DRG: 463 | End: 2021-11-12
Attending: EMERGENCY MEDICINE | Admitting: HOSPITALIST
Payer: MEDICAID

## 2021-11-10 ENCOUNTER — APPOINTMENT (OUTPATIENT)
Dept: CT IMAGING | Age: 21
DRG: 463 | End: 2021-11-10
Attending: PHYSICIAN ASSISTANT
Payer: MEDICAID

## 2021-11-10 DIAGNOSIS — N12 PYELITIS: Primary | ICD-10-CM

## 2021-11-10 PROBLEM — R11.10 ABDOMINAL PAIN WITH VOMITING: Status: ACTIVE | Noted: 2021-11-10

## 2021-11-10 PROBLEM — R10.9 ABDOMINAL PAIN WITH VOMITING: Status: ACTIVE | Noted: 2021-11-10

## 2021-11-10 PROBLEM — J45.909 REACTIVE AIRWAY DISEASE: Status: ACTIVE | Noted: 2021-11-10

## 2021-11-10 LAB
ALBUMIN SERPL-MCNC: 4.4 G/DL (ref 3.4–5)
ALBUMIN/GLOB SERPL: 1 {RATIO} (ref 0.8–1.7)
ALP SERPL-CCNC: 121 U/L (ref 45–117)
ALT SERPL-CCNC: 50 U/L (ref 13–56)
ANION GAP SERPL CALC-SCNC: 6 MMOL/L (ref 3–18)
APPEARANCE UR: ABNORMAL
AST SERPL-CCNC: 24 U/L (ref 10–38)
ATRIAL RATE: 71 BPM
BACTERIA URNS QL MICRO: ABNORMAL /HPF
BASOPHILS # BLD: 0 K/UL (ref 0–0.1)
BASOPHILS NFR BLD: 0 % (ref 0–2)
BILIRUB SERPL-MCNC: 0.6 MG/DL (ref 0.2–1)
BILIRUB UR QL: ABNORMAL
BUN SERPL-MCNC: 10 MG/DL (ref 7–18)
BUN/CREAT SERPL: 12 (ref 12–20)
CALCIUM SERPL-MCNC: 10.3 MG/DL (ref 8.5–10.1)
CALCULATED P AXIS, ECG09: -10 DEGREES
CALCULATED R AXIS, ECG10: 48 DEGREES
CALCULATED T AXIS, ECG11: 26 DEGREES
CHLORIDE SERPL-SCNC: 106 MMOL/L (ref 100–111)
CK MB CFR SERPL CALC: NORMAL % (ref 0–4)
CK MB SERPL-MCNC: <1 NG/ML (ref 5–25)
CK SERPL-CCNC: 67 U/L (ref 26–192)
CO2 SERPL-SCNC: 26 MMOL/L (ref 21–32)
COLOR UR: ABNORMAL
CREAT SERPL-MCNC: 0.82 MG/DL (ref 0.6–1.3)
DIAGNOSIS, 93000: NORMAL
DIFFERENTIAL METHOD BLD: ABNORMAL
EOSINOPHIL # BLD: 0 K/UL (ref 0–0.4)
EOSINOPHIL NFR BLD: 0 % (ref 0–5)
EPITH CASTS URNS QL MICRO: ABNORMAL /LPF (ref 0–5)
ERYTHROCYTE [DISTWIDTH] IN BLOOD BY AUTOMATED COUNT: 12.1 % (ref 11.6–14.5)
GLOBULIN SER CALC-MCNC: 4.5 G/DL (ref 2–4)
GLUCOSE SERPL-MCNC: 109 MG/DL (ref 74–99)
GLUCOSE UR STRIP.AUTO-MCNC: NEGATIVE MG/DL
HCG SERPL QL: NEGATIVE
HCT VFR BLD AUTO: 47.7 % (ref 35–45)
HGB BLD-MCNC: 16.4 G/DL (ref 12–16)
HGB UR QL STRIP: ABNORMAL
KETONES UR QL STRIP.AUTO: 15 MG/DL
LEUKOCYTE ESTERASE UR QL STRIP.AUTO: ABNORMAL
LIPASE SERPL-CCNC: 62 U/L (ref 73–393)
LYMPHOCYTES # BLD: 2.3 K/UL (ref 0.9–3.6)
LYMPHOCYTES NFR BLD: 12 % (ref 21–52)
MCH RBC QN AUTO: 29.5 PG (ref 24–34)
MCHC RBC AUTO-ENTMCNC: 34.4 G/DL (ref 31–37)
MCV RBC AUTO: 85.8 FL (ref 78–100)
MONOCYTES # BLD: 0.7 K/UL (ref 0.05–1.2)
MONOCYTES NFR BLD: 4 % (ref 3–10)
NEUTS SEG # BLD: 15.7 K/UL (ref 1.8–8)
NEUTS SEG NFR BLD: 84 % (ref 40–73)
NITRITE UR QL STRIP.AUTO: NEGATIVE
P-R INTERVAL, ECG05: 130 MS
PH UR STRIP: 8 [PH] (ref 5–8)
PLATELET # BLD AUTO: 440 K/UL (ref 135–420)
PMV BLD AUTO: 9.2 FL (ref 9.2–11.8)
POTASSIUM SERPL-SCNC: 3.8 MMOL/L (ref 3.5–5.5)
PROT SERPL-MCNC: 8.9 G/DL (ref 6.4–8.2)
PROT UR STRIP-MCNC: 300 MG/DL
Q-T INTERVAL, ECG07: 398 MS
QRS DURATION, ECG06: 80 MS
QTC CALCULATION (BEZET), ECG08: 432 MS
RBC # BLD AUTO: 5.56 M/UL (ref 4.2–5.3)
RBC #/AREA URNS HPF: ABNORMAL /HPF (ref 0–5)
SODIUM SERPL-SCNC: 138 MMOL/L (ref 136–145)
SP GR UR REFRACTOMETRY: 1.03 (ref 1–1.03)
TROPONIN I SERPL-MCNC: <0.02 NG/ML (ref 0–0.04)
UROBILINOGEN UR QL STRIP.AUTO: 1 EU/DL (ref 0.2–1)
VENTRICULAR RATE, ECG03: 71 BPM
WBC # BLD AUTO: 18.9 K/UL (ref 4.6–13.2)
WBC URNS QL MICRO: ABNORMAL /HPF (ref 0–5)

## 2021-11-10 PROCEDURE — 84703 CHORIONIC GONADOTROPIN ASSAY: CPT

## 2021-11-10 PROCEDURE — 81001 URINALYSIS AUTO W/SCOPE: CPT

## 2021-11-10 PROCEDURE — 74177 CT ABD & PELVIS W/CONTRAST: CPT

## 2021-11-10 PROCEDURE — 96375 TX/PRO/DX INJ NEW DRUG ADDON: CPT

## 2021-11-10 PROCEDURE — 74011250636 HC RX REV CODE- 250/636: Performed by: PHYSICIAN ASSISTANT

## 2021-11-10 PROCEDURE — G0378 HOSPITAL OBSERVATION PER HR: HCPCS

## 2021-11-10 PROCEDURE — 74011250636 HC RX REV CODE- 250/636: Performed by: FAMILY MEDICINE

## 2021-11-10 PROCEDURE — 65270000029 HC RM PRIVATE

## 2021-11-10 PROCEDURE — 93005 ELECTROCARDIOGRAM TRACING: CPT

## 2021-11-10 PROCEDURE — 71045 X-RAY EXAM CHEST 1 VIEW: CPT

## 2021-11-10 PROCEDURE — 74011250637 HC RX REV CODE- 250/637: Performed by: FAMILY MEDICINE

## 2021-11-10 PROCEDURE — 87086 URINE CULTURE/COLONY COUNT: CPT

## 2021-11-10 PROCEDURE — 96374 THER/PROPH/DIAG INJ IV PUSH: CPT

## 2021-11-10 PROCEDURE — 74011000636 HC RX REV CODE- 636: Performed by: EMERGENCY MEDICINE

## 2021-11-10 PROCEDURE — 80053 COMPREHEN METABOLIC PANEL: CPT

## 2021-11-10 PROCEDURE — 85025 COMPLETE CBC W/AUTO DIFF WBC: CPT

## 2021-11-10 PROCEDURE — 99284 EMERGENCY DEPT VISIT MOD MDM: CPT

## 2021-11-10 PROCEDURE — 82553 CREATINE MB FRACTION: CPT

## 2021-11-10 PROCEDURE — 83690 ASSAY OF LIPASE: CPT

## 2021-11-10 PROCEDURE — 74011000250 HC RX REV CODE- 250: Performed by: FAMILY MEDICINE

## 2021-11-10 RX ORDER — SODIUM CHLORIDE 0.9 % (FLUSH) 0.9 %
5-40 SYRINGE (ML) INJECTION EVERY 8 HOURS
Status: DISCONTINUED | OUTPATIENT
Start: 2021-11-10 | End: 2021-11-12 | Stop reason: HOSPADM

## 2021-11-10 RX ORDER — ALBUTEROL SULFATE 90 UG/1
2 AEROSOL, METERED RESPIRATORY (INHALATION) DAILY
COMMUNITY

## 2021-11-10 RX ORDER — MORPHINE SULFATE 4 MG/ML
4 INJECTION INTRAVENOUS
Status: DISCONTINUED | OUTPATIENT
Start: 2021-11-10 | End: 2021-11-12 | Stop reason: HOSPADM

## 2021-11-10 RX ORDER — SODIUM CHLORIDE 0.9 % (FLUSH) 0.9 %
5-40 SYRINGE (ML) INJECTION AS NEEDED
Status: DISCONTINUED | OUTPATIENT
Start: 2021-11-10 | End: 2021-11-12 | Stop reason: HOSPADM

## 2021-11-10 RX ORDER — ALBUTEROL SULFATE 0.83 MG/ML
2.5 SOLUTION RESPIRATORY (INHALATION)
Status: DISCONTINUED | OUTPATIENT
Start: 2021-11-10 | End: 2021-11-12 | Stop reason: HOSPADM

## 2021-11-10 RX ORDER — ACETAMINOPHEN 325 MG/1
650 TABLET ORAL
Status: DISCONTINUED | OUTPATIENT
Start: 2021-11-10 | End: 2021-11-12 | Stop reason: HOSPADM

## 2021-11-10 RX ORDER — NALOXONE HYDROCHLORIDE 0.4 MG/ML
0.4 INJECTION, SOLUTION INTRAMUSCULAR; INTRAVENOUS; SUBCUTANEOUS AS NEEDED
Status: DISCONTINUED | OUTPATIENT
Start: 2021-11-10 | End: 2021-11-12 | Stop reason: HOSPADM

## 2021-11-10 RX ORDER — ADHESIVE BANDAGE
30 BANDAGE TOPICAL DAILY PRN
Status: DISCONTINUED | OUTPATIENT
Start: 2021-11-10 | End: 2021-11-12 | Stop reason: HOSPADM

## 2021-11-10 RX ORDER — KETOROLAC TROMETHAMINE 15 MG/ML
30 INJECTION, SOLUTION INTRAMUSCULAR; INTRAVENOUS
Status: DISCONTINUED | OUTPATIENT
Start: 2021-11-10 | End: 2021-11-12 | Stop reason: HOSPADM

## 2021-11-10 RX ORDER — LEVOFLOXACIN 5 MG/ML
750 INJECTION, SOLUTION INTRAVENOUS ONCE
Status: COMPLETED | OUTPATIENT
Start: 2021-11-10 | End: 2021-11-10

## 2021-11-10 RX ORDER — MORPHINE SULFATE 4 MG/ML
4 INJECTION INTRAVENOUS
Status: COMPLETED | OUTPATIENT
Start: 2021-11-10 | End: 2021-11-10

## 2021-11-10 RX ORDER — CALCIUM CARB/MAGNESIUM CARB 311-232MG
5 TABLET ORAL
Status: DISCONTINUED | OUTPATIENT
Start: 2021-11-10 | End: 2021-11-12 | Stop reason: HOSPADM

## 2021-11-10 RX ORDER — SODIUM CHLORIDE 9 MG/ML
125 INJECTION, SOLUTION INTRAVENOUS CONTINUOUS
Status: DISCONTINUED | OUTPATIENT
Start: 2021-11-10 | End: 2021-11-12

## 2021-11-10 RX ORDER — DULOXETIN HYDROCHLORIDE 60 MG/1
60 CAPSULE, DELAYED RELEASE ORAL DAILY
COMMUNITY

## 2021-11-10 RX ORDER — ENOXAPARIN SODIUM 100 MG/ML
40 INJECTION SUBCUTANEOUS EVERY 24 HOURS
Status: DISCONTINUED | OUTPATIENT
Start: 2021-11-10 | End: 2021-11-12 | Stop reason: HOSPADM

## 2021-11-10 RX ORDER — ONDANSETRON 2 MG/ML
4 INJECTION INTRAMUSCULAR; INTRAVENOUS
Status: COMPLETED | OUTPATIENT
Start: 2021-11-10 | End: 2021-11-10

## 2021-11-10 RX ORDER — LEVOFLOXACIN 5 MG/ML
750 INJECTION, SOLUTION INTRAVENOUS EVERY 24 HOURS
Status: DISCONTINUED | OUTPATIENT
Start: 2021-11-11 | End: 2021-11-12 | Stop reason: HOSPADM

## 2021-11-10 RX ORDER — ONDANSETRON 2 MG/ML
4 INJECTION INTRAMUSCULAR; INTRAVENOUS
Status: DISCONTINUED | OUTPATIENT
Start: 2021-11-10 | End: 2021-11-12 | Stop reason: HOSPADM

## 2021-11-10 RX ORDER — DULOXETIN HYDROCHLORIDE 60 MG/1
60 CAPSULE, DELAYED RELEASE ORAL DAILY
Status: DISCONTINUED | OUTPATIENT
Start: 2021-11-10 | End: 2021-11-12 | Stop reason: HOSPADM

## 2021-11-10 RX ADMIN — IOPAMIDOL 100 ML: 612 INJECTION, SOLUTION INTRAVENOUS at 16:48

## 2021-11-10 RX ADMIN — ONDANSETRON 4 MG: 2 INJECTION INTRAMUSCULAR; INTRAVENOUS at 16:09

## 2021-11-10 RX ADMIN — ENOXAPARIN SODIUM 40 MG: 40 INJECTION SUBCUTANEOUS at 21:17

## 2021-11-10 RX ADMIN — ALBUTEROL SULFATE 2.5 MG: 2.5 SOLUTION RESPIRATORY (INHALATION) at 23:57

## 2021-11-10 RX ADMIN — ONDANSETRON 4 MG: 2 INJECTION INTRAMUSCULAR; INTRAVENOUS at 21:07

## 2021-11-10 RX ADMIN — SODIUM CHLORIDE 125 ML/HR: 900 INJECTION, SOLUTION INTRAVENOUS at 21:08

## 2021-11-10 RX ADMIN — MORPHINE SULFATE 4 MG: 4 INJECTION INTRAVENOUS at 16:09

## 2021-11-10 RX ADMIN — DULOXETINE 60 MG: 60 CAPSULE, DELAYED RELEASE ORAL at 22:32

## 2021-11-10 RX ADMIN — SODIUM CHLORIDE 1000 ML: 900 INJECTION, SOLUTION INTRAVENOUS at 15:57

## 2021-11-10 RX ADMIN — LEVOFLOXACIN 750 MG: 5 INJECTION, SOLUTION INTRAVENOUS at 18:12

## 2021-11-10 RX ADMIN — ARFORMOTEROL TARTRATE: 15 SOLUTION RESPIRATORY (INHALATION) at 23:57

## 2021-11-10 NOTE — H&P
History & Physical    Patient: Maik Tellez MRN: [id number]  CSN: [id number]    YOB: 2000  Age: 19 y.o. Sex: female      DOA: 11/10/2021  Primary Care Provider:  Russ Pal MD      Assessment/Plan   Maik Tellez is a 19 y.o. female presents to the emergency department complaining of left lower quadrant abdominal pain that started 3 days ago with associated nausea vomiting and diarrhea. Admitting for pyelonephritis for IV abx because pt likely to fail OP management on PO Abx because of persistent N/V     Acute pyelonephritis -  Elevated WBC 18.9, left shift   UA abnormal   CT abd/pelvis: Nonobstructive calculus, lower pole left kidney, left periureteral stranding concerning for pyelitis. No ureteral obstruction   Continue IV abx  Follow urine culture  Follow WBC  rec'v 1000cc NS bolus, zofran 4mg and morphine 4mg in ED    Abdominal pain and N/V -  Likely due to pyelo  Pain control   Anti-emetics     nonobstructive calculus in the lower pole the left kidney measuring 7.4 mm with mild left hydronephrosis  with periureteral stranding. No hydroureter or ureteral calculi seen  -will treat for pyelo  -consider repeat CT OP and possible f/u with urology     DVT and GI prophylaxis       Patient Active Problem List   Diagnosis Code    Elevated TSH R79.89    Positive urine drug screen R82.5    Midline low back pain without sciatica M54.50    Neck pain M54.2    Pyelitis N12    Abdominal pain with vomiting R10.9, R11.10     Estimated length of stay: less than 48     CC: left lower quadrant abdominal pain that started 3 days ago with associated nausea vomiting and diarrhea. HPI:     Maik Tellez is a 19 y.o. female with h/o asthma and lo back pain due to accident, presents to the emergency department complaining of left lower quadrant abdominal pain that started 3 days ago with associated nausea vomiting and diarrhea.   Denies urinary symptoms, fever, chills, chest pain or SOB. Patient denies vaginal discharge. Last menstrual period was 10/30. Patient advised that she was diagnosed with a sinus infection and was trying to start the antibiotics yesterday but unable to keep them down. No history of abdominal surgeries in the past.     Past Medical History:   Diagnosis Date    Anemia associated with acute blood loss 4/19/2019    Epilepsy ([address]     Migraines     PTSD (post-traumatic stress disorder)     Pyelitis 11/10/2021    STD (female) 2018    GC/CT    Trauma        Past Surgical History:   Procedure Laterality Date    HX HERNIA REPAIR      HX OTHER SURGICAL         Family History   Problem Relation Age of Onset    Bleeding Prob Mother     Thyroid Disease Mother     Ovarian Cancer Mother         age 44-44    Pancreatic Cancer Mother         age 44-44       Social History     Socioeconomic History    Marital status: UNKNOWN   Tobacco Use    Smoking status: Never Smoker    Smokeless tobacco: Never Used   Vaping Use    Vaping Use: Some days    Substances: Nicotine   Substance and Sexual Activity    Alcohol use: Yes    Drug use: No    Sexual activity: Yes     Partners: Male     Birth control/protection: Condom, Injection       Prior to Admission medications    Medication Sig Start Date End Date Taking? Authorizing Provider   methocarbamoL (Robaxin) 500 mg tablet Take 2 Tabs by mouth three (3) times daily (after meals). Indications: muscle spasm 4/12/21   Tony Colon MD   buPROPion SR (Wellbutrin SR) 100 mg SR tablet Take  by mouth.     Provider, Britney   medroxyPROGESTERone (DEPO-PROVERA) 150 mg/mL syrg INJECT 1 ML INTRAMUSCULARLY EVERY 3 MONTHS 8/12/20   Tavia Wolfe NP   predniSONE (DELTASONE) 20 mg tablet Take 3 pills for 2 days, 2.5 pills for 2 days, 2 pills for 2 days, 1.5 pills for 2 days, 1 pills for 2 days, 1/2 pill for 2 days 11/24/19   Roxi FELICIANO DO   tiZANidine (ZANAFLEX) 2 mg capsule Take 1 Cap by mouth three (3) times daily as needed (back spasm). 19   Itzel [place], DO       Allergies   Allergen Reactions    Pcn [Penicillins] Hives    Sulfa (Sulfonamide Antibiotics) Hives       Review of Systems  Gen: No fever, chills, malaise, weight loss/gain. Heent: No headache, rhinorrhea, epistaxis, ear pain, hearing loss, sinus pain, neck pain/stiffness, sore throat. Heart: No chest pain, palpitations, MAY, pnd, or orthopnea. Resp: No cough, hemoptysis, wheezing and shortness of breath. GI: +nausea, vomiting, no diarrhea, constipation, melena or hematochezia. : No urinary obstruction, dysuria or hematuria. Derm: No rash, new skin lesion or pruritis. Musc/skeletal: no bone or joint complains. Vasc: No edema, cyanosis or claudication. Endo: No heat/cold intolerance, no polyuria,polydipsia or polyphagia. Neuro: No unilateral weakness, numbness, tingling. No seizures. Heme: No easy bruising or bleeding. Physical Exam:     Physical Exam:  Visit Vitals  BP (!) 134/94 (BP 1 Location: Right upper arm, BP Patient Position: Sitting)   Pulse 66   Temp 97.9 °F (36.6 °C)   Resp 20   Ht 5' 7\" (1.702 m)   Wt 109.8 kg (242 lb)   SpO2 100%   BMI 37.90 kg/m²      O2 Device: None (Room air)    Temp (24hrs), Av.9 °F (36.6 °C), Min:97.9 °F (36.6 °C), Max:97.9 °F (36.6 °C)    No intake/output data recorded. No intake/output data recorded. General:  Awake, cooperative, no distress. Head:  Normocephalic, without obvious abnormality, atraumatic. Eyes:  Conjunctivae/corneas clear, sclera anicteric, PERRL, EOMs intact. Nose: Nares normal. No drainage or sinus tenderness. Throat: Lips, mucosa, and tongue normal.    Neck: Supple, symmetrical, trachea midline, no adenopathy. Lungs:   Clear to auscultation bilaterally. Heart:  Regular rate and rhythm, S1, S2 normal, no murmur, click, rub or gallop. Abdomen: Soft, non-tender. Bowel sounds normal. No masses,  No organomegaly.    Extremities: Extremities normal, atraumatic, no cyanosis or edema. Capillary refill normal.   Pulses: 2+ and symmetric all extremities. Skin: Skin color as per ethnicity , turgor normal. No rashes or lesions   Neurologic: CNII-XII intact. No focal motor or sensory deficit. Labs Reviewed:    Recent Results (from the past 24 hour(s))   EKG, 12 LEAD, INITIAL    Collection Time: 11/10/21  3:19 PM   Result Value Ref Range    Ventricular Rate 71 BPM    Atrial Rate 71 BPM    P-R Interval 130 ms    QRS Duration 80 ms    Q-T Interval 398 ms    QTC Calculation (Bezet) 432 ms    Calculated P Axis -10 degrees    Calculated R Axis 48 degrees    Calculated T Axis 26 degrees    Diagnosis       Normal sinus rhythm with sinus arrhythmia  Normal ECG  No previous ECGs available     CBC WITH AUTOMATED DIFF    Collection Time: 11/10/21  3:23 PM   Result Value Ref Range    WBC 18.9 (H) 4.6 - 13.2 K/uL    RBC 5.56 (H) 4.20 - 5.30 M/uL    HGB 16.4 (H) 12.0 - 16.0 g/dL    HCT 47.7 (H) 35.0 - 45.0 %    MCV 85.8 78.0 - 100.0 FL    MCH 29.5 24.0 - 34.0 PG    MCHC 34.4 31.0 - 37.0 g/dL    RDW 12.1 11.6 - 14.5 %    PLATELET 025 (H) 816 - 420 K/uL    MPV 9.2 9.2 - 11.8 FL    NEUTROPHILS 84 (H) 40 - 73 %    LYMPHOCYTES 12 (L) 21 - 52 %    MONOCYTES 4 3 - 10 %    EOSINOPHILS 0 0 - 5 %    BASOPHILS 0 0 - 2 %    ABS. NEUTROPHILS 15.7 (H) 1.8 - 8.0 K/UL    ABS. LYMPHOCYTES 2.3 0.9 - 3.6 K/UL    ABS. MONOCYTES 0.7 0.05 - 1.2 K/UL    ABS. EOSINOPHILS 0.0 0.0 - 0.4 K/UL    ABS.  BASOPHILS 0.0 0.0 - 0.1 K/UL    DF AUTOMATED     METABOLIC PANEL, COMPREHENSIVE    Collection Time: 11/10/21  3:23 PM   Result Value Ref Range    Sodium 138 136 - 145 mmol/L    Potassium 3.8 3.5 - 5.5 mmol/L    Chloride 106 100 - 111 mmol/L    CO2 26 21 - 32 mmol/L    Anion gap 6 3.0 - 18 mmol/L    Glucose 109 (H) 74 - 99 mg/dL    BUN 10 7.0 - 18 MG/DL    Creatinine 0.82 0.6 - 1.3 MG/DL    BUN/Creatinine ratio 12 12 - 20      GFR est AA >60 >60 ml/min/1.73m2    GFR est non-AA >60 >60 ml/min/1.73m2 Calcium 10.3 (H) 8.5 - 10.1 MG/DL    Bilirubin, total 0.6 0.2 - 1.0 MG/DL    ALT (SGPT) 50 13 - 56 U/L    AST (SGOT) 24 10 - 38 U/L    Alk.  phosphatase 121 (H) 45 - 117 U/L    Protein, total 8.9 (H) 6.4 - 8.2 g/dL    Albumin 4.4 3.4 - 5.0 g/dL    Globulin 4.5 (H) 2.0 - 4.0 g/dL    A-G Ratio 1.0 0.8 - 1.7     LIPASE    Collection Time: 11/10/21  3:23 PM   Result Value Ref Range    Lipase 62 (L) 73 - 393 U/L   HCG QL SERUM    Collection Time: 11/10/21  3:23 PM   Result Value Ref Range    HCG, Ql. Negative NEG     CARDIAC PANEL,(CK, CKMB & TROPONIN)    Collection Time: 11/10/21  3:23 PM   Result Value Ref Range    CK - MB <1.0 <3.6 ng/ml    CK-MB Index  0.0 - 4.0 %     CALCULATION NOT PERFORMED WHEN RESULT IS BELOW LINEAR LIMIT    CK 67 26 - 192 U/L    Troponin-I, QT <0.02 0.0 - 0.045 NG/ML   URINALYSIS W/ RFLX MICROSCOPIC    Collection Time: 11/10/21  4:20 PM   Result Value Ref Range    Color DARK YELLOW      Appearance CLOUDY      Specific gravity 1.029 1.005 - 1.030      pH (UA) 8.0 5.0 - 8.0      Protein 300 (A) NEG mg/dL    Glucose Negative NEG mg/dL    Ketone 15 (A) NEG mg/dL    Bilirubin SMALL (A) NEG      Blood LARGE (A) NEG      Urobilinogen 1.0 0.2 - 1.0 EU/dL    Nitrites Negative NEG      Leukocyte Esterase SMALL (A) NEG     URINE MICROSCOPIC ONLY    Collection Time: 11/10/21  4:20 PM   Result Value Ref Range    WBC 11 to 20 0 - 5 /hpf    RBC TOO NUMEROUS TO COUNT 0 - 5 /hpf    Epithelial cells 3+ 0 - 5 /lpf    Bacteria FEW (A) NEG /hpf       Procedures/imaging: see electronic medical records for all procedures/Xrays and details which were not copied into this note but were reviewed prior to creation of Plan      CC: Elisha Boyce MD

## 2021-11-10 NOTE — Clinical Note
Patient Class[de-identified] OBSERVATION [895]   Type of Bed: Medical [8]   Cardiac Monitoring Required?: No   Reason for Observation: pyelitis   Admitting Diagnosis: Pyelitis [713776]   Admitting Physician: Karie Jacobs [[id number]]   Attending Physician: Karie Jacobs [[id number]]

## 2021-11-10 NOTE — ED PROVIDER NOTES
EMERGENCY DEPARTMENT HISTORY AND PHYSICAL EXAM    Date: 11/10/2021  Patient Name: Yany Luo    History of Presenting Illness     Chief Complaint   Patient presents with    Abdominal Pain         History Provided By: Patient    Chief Complaint: abd pain       Additional History (Context):   0:76 PM  Yany Luo is a 19 y.o. female  presents to the emergency department C/O left lower quadrant abdominal pain that started 3 days ago with associated nausea vomiting and diarrhea. No urinary symptoms. No fevers. Patient denies vaginal discharge. Last menstrual period was 10/30. Patient also complaining of some pressure in her chest that started last night and has been constant since that time. No shortness of breath. Patient states that she was diagnosed with a sinus infection and was trying to start the antibiotics yesterday but unable to keep them down. No history of abdominal surgeries in the past     PCP: Rohit Varner MD    Current Facility-Administered Medications   Medication Dose Route Frequency Provider Last Rate Last Admin    levoFLOXacin (LEVAQUIN) 750 mg in D5W IVPB  750 mg IntraVENous ONCE [place], [place] 100 mL/hr at 11/10/21 1812 750 mg at 11/10/21 1812     Current Outpatient Medications   Medication Sig Dispense Refill    methocarbamoL (Robaxin) 500 mg tablet Take 2 Tabs by mouth three (3) times daily (after meals). Indications: muscle spasm 30 Tab 0    buPROPion SR (Wellbutrin SR) 100 mg SR tablet Take  by mouth.  medroxyPROGESTERone (DEPO-PROVERA) 150 mg/mL syrg INJECT 1 ML INTRAMUSCULARLY EVERY 3 MONTHS 1 mL 3    predniSONE (DELTASONE) 20 mg tablet Take 3 pills for 2 days, 2.5 pills for 2 days, 2 pills for 2 days, 1.5 pills for 2 days, 1 pills for 2 days, 1/2 pill for 2 days 21 Tab 0    tiZANidine (ZANAFLEX) 2 mg capsule Take 1 Cap by mouth three (3) times daily as needed (back spasm).  15 Cap 0       Past History     Past Medical History:  Past Medical History:   Diagnosis Date    Anemia associated with acute blood loss 4/19/2019    Epilepsy ([address]     Migraines     PTSD (post-traumatic stress disorder)     Pyelitis 11/10/2021    STD (female) 2018    GC/CT    Trauma        Past Surgical History:  Past Surgical History:   Procedure Laterality Date    HX HERNIA REPAIR      HX OTHER SURGICAL         Family History:  Family History   Problem Relation Age of Onset    Bleeding Prob Mother     Thyroid Disease Mother     Ovarian Cancer Mother         age 44-44    Pancreatic Cancer Mother         age 44-44       Social History:  Social History     Tobacco Use    Smoking status: Never Smoker    Smokeless tobacco: Never Used   Vaping Use    Vaping Use: Some days    Substances: Nicotine   Substance Use Topics    Alcohol use: Yes    Drug use: No       Allergies: Allergies   Allergen Reactions    Pcn [Penicillins] Hives    Sulfa (Sulfonamide Antibiotics) Hives       Review of Systems   Review of Systems   Constitutional: Negative for chills and fever. Respiratory: Negative for shortness of breath. Cardiovascular: Positive for chest pain. Gastrointestinal: Positive for abdominal pain, diarrhea, nausea and vomiting. Musculoskeletal: Negative for back pain. Neurological: Negative for dizziness, weakness, numbness and headaches. All other systems reviewed and are negative. Physical Exam     Vitals:    11/10/21 1447   BP: (!) 134/94   Pulse: 66   Resp: 20   Temp: 97.9 °F (36.6 °C)   SpO2: 100%   Weight: 109.8 kg (242 lb)   Height: 5' 7\" (1.702 m)     Physical Exam  Vitals and nursing note reviewed. Constitutional:       Appearance: She is well-developed. Comments: Patient lying on stretcher nontoxic   HENT:      Head: Normocephalic and atraumatic. Cardiovascular:      Rate and Rhythm: Normal rate and regular rhythm. Heart sounds: Normal heart sounds. No murmur heard.       Pulmonary:      Effort: Pulmonary effort is normal. No respiratory distress. Breath sounds: Normal breath sounds. No wheezing or rales. Abdominal:      General: Bowel sounds are normal.      Palpations: Abdomen is soft. Tenderness: There is abdominal tenderness in the left lower quadrant. There is no right CVA tenderness or left CVA tenderness. Musculoskeletal:      Cervical back: Normal range of motion and neck supple. Neurological:      Mental Status: She is alert and oriented to person, place, and time. Psychiatric:         Judgment: Judgment normal.         Diagnostic Study Results     Labs:     Recent Results (from the past 12 hour(s))   EKG, 12 LEAD, INITIAL    Collection Time: 11/10/21  3:19 PM   Result Value Ref Range    Ventricular Rate 71 BPM    Atrial Rate 71 BPM    P-R Interval 130 ms    QRS Duration 80 ms    Q-T Interval 398 ms    QTC Calculation (Bezet) 432 ms    Calculated P Axis -10 degrees    Calculated R Axis 48 degrees    Calculated T Axis 26 degrees    Diagnosis       Normal sinus rhythm with sinus arrhythmia  Normal ECG  No previous ECGs available     CBC WITH AUTOMATED DIFF    Collection Time: 11/10/21  3:23 PM   Result Value Ref Range    WBC 18.9 (H) 4.6 - 13.2 K/uL    RBC 5.56 (H) 4.20 - 5.30 M/uL    HGB 16.4 (H) 12.0 - 16.0 g/dL    HCT 47.7 (H) 35.0 - 45.0 %    MCV 85.8 78.0 - 100.0 FL    MCH 29.5 24.0 - 34.0 PG    MCHC 34.4 31.0 - 37.0 g/dL    RDW 12.1 11.6 - 14.5 %    PLATELET 185 (H) 389 - 420 K/uL    MPV 9.2 9.2 - 11.8 FL    NEUTROPHILS 84 (H) 40 - 73 %    LYMPHOCYTES 12 (L) 21 - 52 %    MONOCYTES 4 3 - 10 %    EOSINOPHILS 0 0 - 5 %    BASOPHILS 0 0 - 2 %    ABS. NEUTROPHILS 15.7 (H) 1.8 - 8.0 K/UL    ABS. LYMPHOCYTES 2.3 0.9 - 3.6 K/UL    ABS. MONOCYTES 0.7 0.05 - 1.2 K/UL    ABS. EOSINOPHILS 0.0 0.0 - 0.4 K/UL    ABS.  BASOPHILS 0.0 0.0 - 0.1 K/UL    DF AUTOMATED     METABOLIC PANEL, COMPREHENSIVE    Collection Time: 11/10/21  3:23 PM   Result Value Ref Range    Sodium 138 136 - 145 mmol/L    Potassium 3.8 3.5 - 5.5 mmol/L Chloride 106 100 - 111 mmol/L    CO2 26 21 - 32 mmol/L    Anion gap 6 3.0 - 18 mmol/L    Glucose 109 (H) 74 - 99 mg/dL    BUN 10 7.0 - 18 MG/DL    Creatinine 0.82 0.6 - 1.3 MG/DL    BUN/Creatinine ratio 12 12 - 20      GFR est AA >60 >60 ml/min/1.73m2    GFR est non-AA >60 >60 ml/min/1.73m2    Calcium 10.3 (H) 8.5 - 10.1 MG/DL    Bilirubin, total 0.6 0.2 - 1.0 MG/DL    ALT (SGPT) 50 13 - 56 U/L    AST (SGOT) 24 10 - 38 U/L    Alk. phosphatase 121 (H) 45 - 117 U/L    Protein, total 8.9 (H) 6.4 - 8.2 g/dL    Albumin 4.4 3.4 - 5.0 g/dL    Globulin 4.5 (H) 2.0 - 4.0 g/dL    A-G Ratio 1.0 0.8 - 1.7     LIPASE    Collection Time: 11/10/21  3:23 PM   Result Value Ref Range    Lipase 62 (L) 73 - 393 U/L   HCG QL SERUM    Collection Time: 11/10/21  3:23 PM   Result Value Ref Range    HCG, Ql. Negative NEG     CARDIAC PANEL,(CK, CKMB & TROPONIN)    Collection Time: 11/10/21  3:23 PM   Result Value Ref Range    CK - MB <1.0 <3.6 ng/ml    CK-MB Index  0.0 - 4.0 %     CALCULATION NOT PERFORMED WHEN RESULT IS BELOW LINEAR LIMIT    CK 67 26 - 192 U/L    Troponin-I, QT <0.02 0.0 - 0.045 NG/ML   URINALYSIS W/ RFLX MICROSCOPIC    Collection Time: 11/10/21  4:20 PM   Result Value Ref Range    Color DARK YELLOW      Appearance CLOUDY      Specific gravity 1.029 1.005 - 1.030      pH (UA) 8.0 5.0 - 8.0      Protein 300 (A) NEG mg/dL    Glucose Negative NEG mg/dL    Ketone 15 (A) NEG mg/dL    Bilirubin SMALL (A) NEG      Blood LARGE (A) NEG      Urobilinogen 1.0 0.2 - 1.0 EU/dL    Nitrites Negative NEG      Leukocyte Esterase SMALL (A) NEG     URINE MICROSCOPIC ONLY    Collection Time: 11/10/21  4:20 PM   Result Value Ref Range    WBC 11 to 20 0 - 5 /hpf    RBC TOO NUMEROUS TO COUNT 0 - 5 /hpf    Epithelial cells 3+ 0 - 5 /lpf    Bacteria FEW (A) NEG /hpf       Radiologic Studies:   CT ABD PELV W CONT   Final Result      Nonobstructive calculus in the lower pole the left kidney.  There is left   periureteral stranding concerning for pyelitis. No ureteral obstruction. XR CHEST PORT   Final Result      No acute radiographic cardiopulmonary abnormality         CT Results  (Last 48 hours)               11/10/21 1651  CT ABD PELV W CONT Final result    Impression:      Nonobstructive calculus in the lower pole the left kidney. There is left   periureteral stranding concerning for pyelitis. No ureteral obstruction. Narrative:  EXAM: CT of the abdomen and pelvis       INDICATION: Abdominal pain       COMPARISON: April 11, 2021       TECHNIQUE: Axial CT imaging of the abdomen and pelvis was performed with   intravenous contrast. Multiplanar reformats were generated. One or more dose   reduction techniques were used on this CT: automated exposure control,   adjustment of the mAs and/or kVp according to patient size, and iterative   reconstruction techniques. The specific techniques used on this CT exam have   been documented in the patient's electronic medical record. Digital Imaging and   Communications in Medicine (DICOM) format image data are available to   nonaffiliated external healthcare facilities or entities on a secure, media   free, reciprocally searchable basis with patient authorization for at least a   12-month period after this study. _______________       FINDINGS:       LOWER CHEST: Unremarkable. LIVER, BILIARY: Liver is normal. No biliary dilation. Gallbladder is   unremarkable. PANCREAS: Normal.       SPLEEN: Normal.       ADRENALS: Normal.       KIDNEYS: Right kidney is unremarkable. There is a nonobstructive calculus in the   lower pole the left kidney measuring 7.4 mm. There is mild left hydronephrosis   with periureteral stranding. No hydroureter or ureteral calculi seen. VASCULATURE: Unremarkable. There is a retroaortic left renal vein. LYMPH NODES: No enlarged lymph nodes. GASTROINTESTINAL TRACT: No bowel dilation or wall thickening. Appendix is   normal. No bowel obstruction seen. PELVIC ORGANS: Unremarkable. BONES: No acute or aggressive osseous abnormalities identified. OTHER: None.       _______________               CXR Results  (Last 48 hours)               11/10/21 1559  XR CHEST PORT Final result    Impression:      No acute radiographic cardiopulmonary abnormality        Narrative:  EXAM: XR CHEST PORT       CLINICAL INDICATION/HISTORY: chest pain   -Additional: None       COMPARISON: None       TECHNIQUE: Frontal view of the chest       _______________       FINDINGS:       HEART AND MEDIASTINUM: Normal cardiac size and mediastinal contours. LUNGS AND PLEURAL SPACES: No focal pneumonic consolidation, pneumothorax, or   pleural effusion. BONY THORAX AND SOFT TISSUES: No acute osseous abnormality       _______________                 Medical Decision Making   I am the first provider for this patient. I reviewed the vital signs, available nursing notes, past medical history, past surgical history, family history and social history. Vital Signs: Reviewed the patient's vital signs. Pulse Oximetry Analysis: 100% on RA       EKG interpretation: (Preliminary)  2:56 PM   Normal sinus rhythm with sinus arrhythmia rate 71 bpm no STEMI  EKG read by Ellen Chan PA-C   at 3:20 PM     Records Reviewed: Nursing Notes and Old Medical Records    Procedures:  Procedures    ED Course:   2:56 PM Initial assessment performed. The patients presenting problems have been discussed, and they are in agreement with the care plan formulated and outlined with them. I have encouraged them to ask questions as they arise throughout their visit. FACE-TO-FACE PROGRESS NOTE:  6:30 PM  The patient presents with left-sided abdominal pain with frequency urgency and dysuria. She states she has felt warm but otherwise no active fevers or diarrhea. .  My exam shows well-appearing nontoxic female uncomfortable with palpation to the left side of the abdomen as well labs left CVA tenderness. Imp/plan: Blood count elevated and CT confirming pyelonephritis. Given findings after discussion with patient PA and hospitalist agreeable keep her overnight hospital observation. .   I have personally seen and examined the patient, reviewed the TANESHA's note and agree with findings and plan. Written by Fredia Mortimer, MD.      Core Measures:  For Hospitalized Patients:    1. Hospitalization Decision Time:  The decision to hospitalize the patient was made by Daine Severe, PA-C   at 6:16 PM   on 11/10/2021    2. Aspirin: Aspirin was not given because the patient did not present with a stroke at the time of their Emergency Department evaluation    6:16 PM  Patient is being admitted to the hospital by Dr. Saundra Nazario. The results of their tests and reasons for their admission have been discussed with them and/or available family. They convey agreement and understanding for the need to be admitted and for their admission diagnosis. CONDITIONS ON ADMISSION:  Sepsis is not present at the time of admission. Deep Vein Thrombosis is not present at the time of admission. Thrombosis is not present at the time of admission. Urinary Tract Infection is present at the time of admission. Pneumonia is not present at the time of admission. MRSA is not present at the time of admission. Wound infection is not present at the time of admission. Pressure Ulcer is not present at the time of admission. CLINICAL IMPRESSION:    1. Pyelitis          Discussion:  Pt presents with nausea vomiting diarrhea found to have elevated white blood cell count. Does have UTI and findings consistent with pyelitis. Renal stone but no ureteral stone seen on CT scan. Given patient's history of vomiting we will keep in the hospital for observation for IV antibiotics. Diagnosis and Disposition         CLINICAL IMPRESSION:    1. Pyelitis        PLAN:  1.  Admit for obs         Please note that this dictation was completed with Dragon, the computer voice recognition software. Quite often unanticipated grammatical, syntax, homophones, and other interpretive errors are inadvertently transcribed by the computer software. Please disregard these errors. Please excuse any errors that have escaped final proofreading.

## 2021-11-11 PROBLEM — N20.0 LEFT RENAL STONE: Status: ACTIVE | Noted: 2021-11-11

## 2021-11-11 LAB
ANION GAP SERPL CALC-SCNC: 6 MMOL/L (ref 3–18)
BACTERIA SPEC CULT: NORMAL
BUN SERPL-MCNC: 10 MG/DL (ref 7–18)
BUN/CREAT SERPL: 12 (ref 12–20)
CALCIUM SERPL-MCNC: 9.3 MG/DL (ref 8.5–10.1)
CHLORIDE SERPL-SCNC: 108 MMOL/L (ref 100–111)
CO2 SERPL-SCNC: 25 MMOL/L (ref 21–32)
CREAT SERPL-MCNC: 0.81 MG/DL (ref 0.6–1.3)
ERYTHROCYTE [DISTWIDTH] IN BLOOD BY AUTOMATED COUNT: 12.6 % (ref 11.6–14.5)
GLUCOSE SERPL-MCNC: 97 MG/DL (ref 74–99)
HCT VFR BLD AUTO: 44.5 % (ref 35–45)
HGB BLD-MCNC: 14.5 G/DL (ref 12–16)
MCH RBC QN AUTO: 28.8 PG (ref 24–34)
MCHC RBC AUTO-ENTMCNC: 32.6 G/DL (ref 31–37)
MCV RBC AUTO: 88.5 FL (ref 78–100)
PLATELET # BLD AUTO: 246 K/UL (ref 135–420)
PMV BLD AUTO: 9.9 FL (ref 9.2–11.8)
POTASSIUM SERPL-SCNC: 3.7 MMOL/L (ref 3.5–5.5)
RBC # BLD AUTO: 5.03 M/UL (ref 4.2–5.3)
SERVICE CMNT-IMP: NORMAL
SODIUM SERPL-SCNC: 139 MMOL/L (ref 136–145)
WBC # BLD AUTO: 16.4 K/UL (ref 4.6–13.2)

## 2021-11-11 PROCEDURE — 65270000029 HC RM PRIVATE

## 2021-11-11 PROCEDURE — 74011250636 HC RX REV CODE- 250/636: Performed by: FAMILY MEDICINE

## 2021-11-11 PROCEDURE — 36415 COLL VENOUS BLD VENIPUNCTURE: CPT

## 2021-11-11 PROCEDURE — 94760 N-INVAS EAR/PLS OXIMETRY 1: CPT

## 2021-11-11 PROCEDURE — 85027 COMPLETE CBC AUTOMATED: CPT

## 2021-11-11 PROCEDURE — G0378 HOSPITAL OBSERVATION PER HR: HCPCS

## 2021-11-11 PROCEDURE — 80048 BASIC METABOLIC PNL TOTAL CA: CPT

## 2021-11-11 PROCEDURE — 94640 AIRWAY INHALATION TREATMENT: CPT

## 2021-11-11 PROCEDURE — 74011000250 HC RX REV CODE- 250: Performed by: FAMILY MEDICINE

## 2021-11-11 RX ADMIN — ARFORMOTEROL TARTRATE: 15 SOLUTION RESPIRATORY (INHALATION) at 20:14

## 2021-11-11 RX ADMIN — LEVOFLOXACIN 750 MG: 5 INJECTION, SOLUTION INTRAVENOUS at 18:08

## 2021-11-11 RX ADMIN — ALBUTEROL SULFATE 2.5 MG: 2.5 SOLUTION RESPIRATORY (INHALATION) at 07:29

## 2021-11-11 RX ADMIN — KETOROLAC TROMETHAMINE 30 MG: 15 INJECTION, SOLUTION INTRAMUSCULAR; INTRAVENOUS at 20:31

## 2021-11-11 RX ADMIN — ONDANSETRON 4 MG: 2 INJECTION INTRAMUSCULAR; INTRAVENOUS at 12:08

## 2021-11-11 RX ADMIN — KETOROLAC TROMETHAMINE 30 MG: 15 INJECTION, SOLUTION INTRAMUSCULAR; INTRAVENOUS at 12:07

## 2021-11-11 RX ADMIN — ENOXAPARIN SODIUM 40 MG: 40 INJECTION SUBCUTANEOUS at 18:08

## 2021-11-11 RX ADMIN — ONDANSETRON 4 MG: 2 INJECTION INTRAMUSCULAR; INTRAVENOUS at 20:31

## 2021-11-11 RX ADMIN — SODIUM CHLORIDE 125 ML/HR: 900 INJECTION, SOLUTION INTRAVENOUS at 12:08

## 2021-11-11 RX ADMIN — Medication 10 ML: at 14:00

## 2021-11-11 RX ADMIN — ARFORMOTEROL TARTRATE: 15 SOLUTION RESPIRATORY (INHALATION) at 07:29

## 2021-11-11 RX ADMIN — KETOROLAC TROMETHAMINE 30 MG: 15 INJECTION, SOLUTION INTRAMUSCULAR; INTRAVENOUS at 00:10

## 2021-11-11 RX ADMIN — Medication 10 ML: at 00:10

## 2021-11-11 NOTE — PROGRESS NOTES
0730 Received report from off going nurse Celestine Bob, [facility]. Report included the following information SBAR, Kardex, Intake/Output, MAR and Recent Results. Gave bedside shift report to oncoming nurse Celestine Bob RN. Report included the following information: SBAR, Kardex, Intake/Output, MAR and Recent Results.

## 2021-11-11 NOTE — ACP (ADVANCE CARE PLANNING)
Advance Care Planning   Advance Care Planning Inpatient Note  [facility] Department    Today's Date: 11/11/2021  Unit: THE [address] SURGICAL/ONCOLOGY    Received request from patient. Upon review of chart and communication with care team, patient's decision making abilities are not in question. Patient was/were present in the room during visit.     Goals of ACP Conversation:  Discuss Advance Care planning documents    Health Care Decision Makers:      Primary Decision Maker: [facility] - [phone]      Summary:  Completed New Documents    Advance Care Planning Documents (Patient Wishes) on file:  Healthcare Power of /Advance Directive appointment of Health care agent  Living Will/ Advance Directive  Anatomical Gift/Organ donation     Assessment:    Patient alone in room and wanted to do the AMD.    Interventions:  Assisted in the completion of documents according to patient's wishes at this time    Care Preferences Communicated:  No    Outcomes/Plan:  New Advance Directive completed  Returned original document(s) to patient, as well as copies for distribution to appointed agents  Copy of Advance Directive given to staff to scan into medical record    Chaplain Skip on 11/11/2021 at 2:40 PM

## 2021-11-11 NOTE — PROGRESS NOTES
Reason for Admission:  left lower quadrant abdominal pain that started 3 days ago with associated nausea vomiting and diarrhea                     RUR Score:   N/A                  Plan for utilizing home health:  Unlikely          PCP: First and Last name:  Clarissa Rick MD     Name of Practice:    Are you a current patient: Yes/No:    Approximate date of last visit:    Can you participate in a virtual visit with your PCP:                     Current Advanced Directive/Advance Care Plan: Full Code      Healthcare Decision Maker:   Click here to complete [address] including selection of the [address] Relationship (ie \"Primary\")             Primary Decision Maker: Latrice Ayala - [phone]                  Transition of Care Plan:    Home with physician follow up                   Chart reviewed. Per H&P Timothy Marroquin is a 19 y.o. female with h/o asthma and lo back pain due to accident, presents to the emergency department complaining of left lower quadrant abdominal pain that started 3 days ago with associated nausea vomiting and diarrhea.  Denies urinary symptoms, fever, chills, chest pain or SOB. Patient denies vaginal discharge.  Last menstrual period was 10/30. Patient advised that she was diagnosed with a sinus infection and was trying to start the antibiotics yesterday but unable to keep them down.  No history of abdominal surgeries in the past. \"    CM met with pt at bedside to discuss transition of care. Pt is  and independent. Pt does no have or use DME. Pt has confirmed her PCP in the community. Pt lives with her  and other family members and they will assist as needed upon discharge. Anticipate pt will transition home with physician follow up when medically stable. CM to continue to follow and assist as needed.       Care Management Interventions  Mode of Transport at Discharge:  Other (see comment) (Family )  Transition of Care Consult (CM Consult): Discharge Planning  Health Maintenance Reviewed: Yes  Support Systems: Spouse/Significant Other, Other Family Member(s)  Confirm Follow Up Transport: Self  The Plan for Transition of Care is Related to the Following Treatment Goals : Home with physician follow up  Discharge Location  Discharge Placement: Home with family assistance

## 2021-11-11 NOTE — ROUTINE PROCESS
TRANSFER - IN REPORT:    Verbal report received from [facility], RN(name) on [address] [facility]  being received from ED(unit) for routine progression of care @1982    Report consisted of patients Situation, Background, Assessment and   Recommendations(SBAR). Information from the following report(s) SBAR, Kardex, ED Summary, Intake/Output, MAR and Recent Results was reviewed with the receiving nurse. Opportunity for questions and clarification was provided. Assessment completed upon patients arrival to unit and care assumed. Received in room via [facility], A/O x4, no distress, IV Levaquin infusing    0715 Bedside and Verbal shift change report given to Suzie Reese RN (oncoming nurse) by Annmarie Coats RN   (offgoing nurse). Report included the following information SBAR, Kardex, Intake/Output, MAR and Recent Results.

## 2021-11-11 NOTE — ED NOTES
TRANSFER - OUT REPORT:    Verbal report given to [address] on [facility]  being transferred to  for routine progression of care       Report consisted of patients Situation, Background, Assessment and   Recommendations(SBAR). Information from the following report(s) SBAR, ED Summary and MAR was reviewed with the receiving nurse. Lines:   Peripheral IV 11/10/21 Right Antecubital (Active)   Site Assessment Clean, dry, & intact 11/10/21 1520   Phlebitis Assessment 0 11/10/21 1520   Infiltration Assessment 0 11/10/21 1520   Dressing Status Clean, dry, & intact 11/10/21 1520   Hub Color/Line Status Pink; Flushed; Patent 11/10/21 1520        Opportunity for questions and clarification was provided.       Patient transported with:   Registered Nurse None

## 2021-11-11 NOTE — PROGRESS NOTES
Hospitalist Progress Note-critical care note     Patient: Zan Irwin MRN: [id number]  CSN: [id number]    YOB: 2000  Age: 19 y.o. Sex: female    DOA: 11/10/2021 LOS:  LOS: 0 days            Chief complaint: pyelitis, abdomen pain, reactive airway disease     Assessment/Plan         Hospital Problems  Date Reviewed: 8/12/2020          Codes Class Noted POA    Left renal stone ICD-10-CM: N20.0  ICD-9-CM: 592.0  11/11/2021 Unknown        * (Principal) Pyelitis ICD-10-CM: N12  ICD-9-CM: 590.80  11/10/2021 Unknown        Abdominal pain with vomiting ICD-10-CM: R10.9, R11.10  ICD-9-CM: 789.00, 787.03  11/10/2021 Unknown        Reactive airway disease ICD-10-CM: J45.909  ICD-9-CM: 493.90  11/10/2021 Unknown                 Zan Irwin is a 19 y.o. female presents to the emergency department complaining of left lower quadrant abdominal pain that started 3 days ago with associated nausea vomiting and diarrhea. Admitting for pyelonephritis for IV abx because pt likely to fail OP management on PO Abx because of persistent N/V      Acute pyelonephritis -  Continue Levaquin  Leukocytosis improving  Urine culture pending  CT abd/pelvis: Nonobstructive calculus, lower pole left kidney, left periureteral stranding concerning for pyelitis. No ureteral obstruction   Continue symptom treatment with Zofran     Abdominal pain and N/V -  Abdomen pain resolved  Pain control   Anti-emetics , no vomiting, still elevated noted     nonobstructive calculus in the lower pole the left kidney measuring 7.4 mm with mild left hydronephrosis  Need to follow-up with urologist   It was first time to be told she has renal stone-mother has stone    Reactive airway disease   Continue breathing tx     Subjective: still having nausea, not so much appetite     disposition :tbd,   Review of systems:    General: No fevers or chills. Cardiovascular: No chest pain or pressure. No palpitations.    Pulmonary: No shortness of breath. Gastrointestinal: +nausea, no vomiting. Vital signs/Intake and Output:  Visit Vitals  /68 (BP 1 Location: Left upper arm, BP Patient Position: At rest)   Pulse 68   Temp 98.2 °F (36.8 °C)   Resp 16   Ht 5' 7\" (1.702 m)   Wt 109.8 kg (242 lb)   SpO2 94%   Breastfeeding No   BMI 37.90 kg/m²     Current Shift:  No intake/output data recorded. Last three shifts:  11/09 1901 - 11/11 0700  In: -   Out: 100 [Urine:100]    Physical Exam:  General: WD, WN. Alert, cooperative, no acute distress    HEENT: NC, Atraumatic. PERRLA, anicteric sclerae. Lungs: CTA Bilaterally. No Wheezing/Rhonchi/Rales. Heart:  Regular  rhythm,  No murmur, No Rubs, No Gallops  Abdomen: Soft, Non distended, Non tender. +Bowel sounds,   Extremities: No c/c/e  Psych:   Not anxious or agitated. Neurologic:  No acute neurological deficit. Labs: Results:       Chemistry Recent Labs     11/11/21  0412 11/10/21  1523   GLU 97 109*    138   K 3.7 3.8    106   CO2 25 26   BUN 10 10   CREA 0.81 0.82   CA 9.3 10.3*   AGAP 6 6   BUCR 12 12   AP  --  121*   TP  --  8.9*   ALB  --  4.4   GLOB  --  4.5*   AGRAT  --  1.0      CBC w/Diff Recent Labs     11/11/21  0412 11/10/21  1523   WBC 16.4* 18.9*   RBC 5.03 5.56*   HGB 14.5 16.4*   HCT 44.5 47.7*    440*   GRANS  --  84*   LYMPH  --  12*   EOS  --  0      Cardiac Enzymes Recent Labs     11/10/21  1523   CPK 67   CKND1 CALCULATION NOT PERFORMED WHEN RESULT IS BELOW LINEAR LIMIT      Coagulation No results for input(s): PTP, INR, APTT, INREXT, INREXT in the last 72 hours. Lipid Panel No results found for: CHOL, CHOLPOCT, CHOLX, CHLST, CHOLV, 796971, HDL, HDLP, LDL, LDLC, DLDLP, 406105, VLDLC, VLDL, TGLX, TRIGL, TRIGP, TGLPOCT, CHHD, CHHDX   BNP No results for input(s): BNPP in the last 72 hours.    Liver Enzymes Recent Labs     11/10/21  1523   TP 8.9*   ALB 4.4   *      Thyroid Studies Lab Results   Component Value Date/Time    TSH 3.990 03/11/2019 04:20 PM        Procedures/imaging: see electronic medical records for all procedures/Xrays and details which were not copied into this note but were reviewed prior to creation of Plan    CT ABD PELV W CONT    Result Date: 11/10/2021  EXAM: CT of the abdomen and pelvis INDICATION: Abdominal pain COMPARISON: April 11, 2021 TECHNIQUE: Axial CT imaging of the abdomen and pelvis was performed with intravenous contrast. Multiplanar reformats were generated. One or more dose reduction techniques were used on this CT: automated exposure control, adjustment of the mAs and/or kVp according to patient size, and iterative reconstruction techniques. The specific techniques used on this CT exam have been documented in the patient's electronic medical record. Digital Imaging and Communications in Medicine (DICOM) format image data are available to nonaffiliated external healthcare facilities or entities on a secure, media free, reciprocally searchable basis with patient authorization for at least a 12-month period after this study. _______________ FINDINGS: LOWER CHEST: Unremarkable. LIVER, BILIARY: Liver is normal. No biliary dilation. Gallbladder is unremarkable. PANCREAS: Normal. SPLEEN: Normal. ADRENALS: Normal. KIDNEYS: Right kidney is unremarkable. There is a nonobstructive calculus in the lower pole the left kidney measuring 7.4 mm. There is mild left hydronephrosis with periureteral stranding. No hydroureter or ureteral calculi seen. VASCULATURE: Unremarkable. There is a retroaortic left renal vein. LYMPH NODES: No enlarged lymph nodes. GASTROINTESTINAL TRACT: No bowel dilation or wall thickening. Appendix is normal. No bowel obstruction seen. PELVIC ORGANS: Unremarkable. BONES: No acute or aggressive osseous abnormalities identified. OTHER: None. _______________     Nonobstructive calculus in the lower pole the left kidney. There is left periureteral stranding concerning for pyelitis. No ureteral obstruction.     XR CHEST PORT    Result Date: 11/10/2021  EXAM: XR CHEST PORT CLINICAL INDICATION/HISTORY: chest pain -Additional: None COMPARISON: None TECHNIQUE: Frontal view of the chest _______________ FINDINGS: HEART AND MEDIASTINUM: Normal cardiac size and mediastinal contours. LUNGS AND PLEURAL SPACES: No focal pneumonic consolidation, pneumothorax, or pleural effusion.  BONY THORAX AND SOFT TISSUES: No acute osseous abnormality _______________     No acute radiographic cardiopulmonary abnormality       Anish Suggs MD

## 2021-11-11 NOTE — PROGRESS NOTES
Pt transported via w/c to [address] [place] [address]. Pt tolerated well and in no apparent distress.  RN aware of pt arrival.

## 2021-11-12 VITALS
TEMPERATURE: 98.2 F | HEART RATE: 71 BPM | OXYGEN SATURATION: 98 % | BODY MASS INDEX: 37.98 KG/M2 | SYSTOLIC BLOOD PRESSURE: 121 MMHG | WEIGHT: 242 LBS | HEIGHT: 67 IN | RESPIRATION RATE: 17 BRPM | DIASTOLIC BLOOD PRESSURE: 69 MMHG

## 2021-11-12 LAB
ANION GAP SERPL CALC-SCNC: 3 MMOL/L (ref 3–18)
BUN SERPL-MCNC: 7 MG/DL (ref 7–18)
BUN/CREAT SERPL: 10 (ref 12–20)
CALCIUM SERPL-MCNC: 8.7 MG/DL (ref 8.5–10.1)
CHLORIDE SERPL-SCNC: 110 MMOL/L (ref 100–111)
CO2 SERPL-SCNC: 27 MMOL/L (ref 21–32)
CREAT SERPL-MCNC: 0.71 MG/DL (ref 0.6–1.3)
ERYTHROCYTE [DISTWIDTH] IN BLOOD BY AUTOMATED COUNT: 12.4 % (ref 11.6–14.5)
GLUCOSE SERPL-MCNC: 92 MG/DL (ref 74–99)
HCT VFR BLD AUTO: 40.2 % (ref 35–45)
HGB BLD-MCNC: 13.3 G/DL (ref 12–16)
MCH RBC QN AUTO: 29.4 PG (ref 24–34)
MCHC RBC AUTO-ENTMCNC: 33.1 G/DL (ref 31–37)
MCV RBC AUTO: 88.7 FL (ref 78–100)
NRBC # BLD: 0 K/UL (ref 0–0.01)
NRBC BLD-RTO: 0 PER 100 WBC
PLATELET # BLD AUTO: 300 K/UL (ref 135–420)
PMV BLD AUTO: 9.1 FL (ref 9.2–11.8)
POTASSIUM SERPL-SCNC: 4.7 MMOL/L (ref 3.5–5.5)
RBC # BLD AUTO: 4.53 M/UL (ref 4.2–5.3)
SODIUM SERPL-SCNC: 140 MMOL/L (ref 136–145)
WBC # BLD AUTO: 13.1 K/UL (ref 4.6–13.2)

## 2021-11-12 PROCEDURE — 85027 COMPLETE CBC AUTOMATED: CPT

## 2021-11-12 PROCEDURE — 94640 AIRWAY INHALATION TREATMENT: CPT

## 2021-11-12 PROCEDURE — 80048 BASIC METABOLIC PNL TOTAL CA: CPT

## 2021-11-12 PROCEDURE — 36415 COLL VENOUS BLD VENIPUNCTURE: CPT

## 2021-11-12 PROCEDURE — 94760 N-INVAS EAR/PLS OXIMETRY 1: CPT

## 2021-11-12 PROCEDURE — G0378 HOSPITAL OBSERVATION PER HR: HCPCS

## 2021-11-12 PROCEDURE — 74011000250 HC RX REV CODE- 250: Performed by: FAMILY MEDICINE

## 2021-11-12 RX ORDER — ONDANSETRON 4 MG/1
4 TABLET, FILM COATED ORAL
Qty: 8 TABLET | Refills: 0 | Status: SHIPPED | OUTPATIENT
Start: 2021-11-12

## 2021-11-12 RX ORDER — LEVOFLOXACIN 500 MG/1
500 TABLET, FILM COATED ORAL DAILY
Qty: 7 TABLET | Refills: 0 | Status: SHIPPED | OUTPATIENT
Start: 2021-11-12 | End: 2021-11-19

## 2021-11-12 RX ORDER — SAME BUTANEDISULFONATE/BETAINE 400-600 MG
500 POWDER IN PACKET (EA) ORAL 2 TIMES DAILY
Qty: 28 CAPSULE | Refills: 0 | Status: SHIPPED | OUTPATIENT
Start: 2021-11-12 | End: 2021-11-19

## 2021-11-12 RX ADMIN — ARFORMOTEROL TARTRATE: 15 SOLUTION RESPIRATORY (INHALATION) at 07:13

## 2021-11-12 NOTE — PROGRESS NOTES
Patient has a discharge order. Discharge paperwork has been printed and the discharge teaching (via readback) has been completed. Patient verbalizes understanding and also understands that she has 3 prescriptions to  from her local [facility]. Patient's IV has been removed. Patient is awaiting her 's arrival. He will be transporting her home. Patient was alert, oriented, and under no apparent distress at the time of her discharge.

## 2021-11-12 NOTE — ADT AUTH CERT NOTES
PA Letter Of Recommendations by Albin Alex RN       Review Status Review Entered   In Primary 2021 11:37      Criteria Review        Name [address] [place] [address]   2000   [facility] # [id number]  ADMIT Date 11/10/2021   Discharge date [unfilled]     [employer]         Clinical summary This pt presented with acute severe nausea and vomiting for pyelonephritis. Vitals Stable   Labs and Imaging Leukocytosis, UTI CT showing non-obstructive urinary stone   MCG criteria applies YES  Comments Due to her persistent N/V pt would not be able to tolerate PO Abx.    Therefore, her care under INPT status is reasonable       This chart was reviewed at 6:34 PM 2021    Alexis Rodriguez MD MD   Physician [facility]   Cell [phone]        Urinary Tract Infection (UTI) - Care Day 3 (2021) by Albin Alex RN       Review Status Review Entered   Completed 2021 11:35      Criteria Review      Care Day: 3 Care Date: 2021 Level of Care: Inpatient Floor    Guideline Day 3    Clinical Status    (X) * Hemodynamic stability    2021 11:35:31 EST by Charles Heck      vitals stable    ( ) * Mental status at baseline    ( ) * Antibiotic regimen for next level of care established    ( ) * Urine output adequate    ( ) * Renal function at baseline or acceptable for next level of care    ( ) * Pain absent or managed    ( ) * Oral intake adequate    ( ) * Afebrile or temperature acceptable for next level of care    ( ) * Vomiting absent    ( ) * Discharge plans and education understood    Activity    (X) * Ambulatory or acceptable for next level of care    2021 11:35:58 EST by Charles Heck      up ad damon    Routes    ( ) * Oral hydration    ( ) * Oral medications or regimen acceptable for next level of care    2021 11:35:31 EST by Hilton Webb see additional clinical for medications    (X) * Oral diet or acceptable for next level of care    2021 11:35:58 EST by Jordin Rios      regular diet    * Milestone   Additional Notes   11/12/21      /69 (BP 1 Location: Left upper arm, BP Patient Position: At rest;Lying)   Pulse 71   Temp 98.2 °F (36.8 °C)   Resp 17   Ht 5' 7\" (1.702 m)   Wt 109.8 kg (242 lb)   SpO2 98%   Breastfeeding No   BMI 37.90 kg/m²                Current Facility-Administered Medications:    ·  sodium chloride (NS) flush 5-40 mL, 5-40 mL, IntraVENous, Q8H, Fermín Bedoya MD, 10 mL at 11/11/21 1400   ·  sodium chloride (NS) flush 5-40 mL, 5-40 mL, IntraVENous, PRN, Fermín Bedoya MD   ·  levoFLOXacin (LEVAQUIN) 750 mg in D5W IVPB, 750 mg, IntraVENous, Q24H, Fermín Bedoya MD, Last Rate: 100 mL/hr at 11/11/21 1808, 750 mg at 11/11/21 1808   ·  acetaminophen (TYLENOL) tablet 650 mg, 650 mg, Oral, Q4H PRN, Fermín Bedoya MD   ·  morphine injection 4 mg, 4 mg, IntraVENous, Q4H PRN, Fermín Bedoya MD   ·  naloxone (NARCAN) injection 0.4 mg, 0.4 mg, IntraVENous, PRN, Kayce Bedoya MD   ·  [facility]ron [facility]) injection 4 mg, 4 mg, IntraVENous, Q4H PRN, Fermín Bedoya MD, 4 mg at 11/11/21 2031   ·  magnesium hydroxide (MILK OF MAGNESIA) 400 mg/5 mL oral suspension 30 mL, 30 mL, Oral, DAILY PRN, Fermín Bedoya MD   ·  melatonin (rapid dissolve) tablet 5 mg, 5 mg, Oral, QHS PRN, Fermín Bedoya MD   ·  enoxaparin (LOVENOX) injection 40 mg, 40 mg, SubCUTAneous, Q24H, Fermín Bedoya MD, 40 mg at 11/11/21 1808   ·  ketorolac (TORADOL) injection 30 mg, 30 mg, IntraVENous, Q6H PRN, Fermín Bedoya MD, 30 mg at 11/11/21 2031   ·  DULoxetine (CYMBALTA) capsule 60 mg, 60 mg, Oral, DAILY, Fermín Bedoya MD, 60 mg at 11/10/21 2232   ·  arformoterol 15 mcg/budesonide 0.5 mg neb solution, , Nebulization, BID RT, Kayce Bedoya MD, Given at 11/12/21 0713   ·  albuterol (PROVENTIL VENTOLIN) nebulizer solution 2.5 mg, 2.5 mg, Nebulization, Q4H PRN, Sandi Bedoya MD, 2.5 mg at 11/11/21 0729               11/12/2021 03:45   MPV: 9.1 (L)   BUN/Creatinine ratio: 10 (L)

## 2021-11-12 NOTE — PROGRESS NOTES
2845 Received bedside shift report from off going nurse Nicola Bedoya, [facility]. Report included the following information SBAR, Kardex, Intake/Output, MAR and Recent Results. 1052 Reinforced discharge teaching provided by another RN.

## 2021-11-12 NOTE — DISCHARGE SUMMARY
Discharge Summary    Patient: Freddie Huitron MRN: [id number]  CSN: [id number]    YOB: 2000  Age: 19 y.o. Sex: female    DOA: 11/10/2021 LOS:  LOS: 0 days   Discharge Date:      Primary Care Provider:  Manjit Jackson MD    Admission Diagnoses: Pyelitis [N12]    Discharge Diagnoses:    Hospital Problems  Date Reviewed: 8/12/2020          Codes Class Noted POA    Left renal stone ICD-10-CM: N20.0  ICD-9-CM: 592.0  11/11/2021 Unknown        * (Principal) Pyelitis ICD-10-CM: N12  ICD-9-CM: 590.80  11/10/2021 Unknown        Abdominal pain with vomiting ICD-10-CM: R10.9, R11.10  ICD-9-CM: 789.00, 787.03  11/10/2021 Unknown        Reactive airway disease ICD-10-CM: J45.909  ICD-9-CM: 493.90  11/10/2021 Unknown              Discharge Condition: stable     Discharge Medications:     Current Discharge Medication List      START taking these medications    Details   Saccharomyces boulardii (FLORASTOR) 250 mg capsule Take 2 Capsules by mouth two (2) times a day for 7 days. Qty: 28 Capsule, Refills: 0  Start date: 11/12/2021, End date: 11/19/2021      levoFLOXacin (Levaquin) 500 mg tablet Take 1 Tablet by mouth daily for 7 days. Qty: 7 Tablet, Refills: 0  Start date: 11/12/2021, End date: 11/19/2021      ondansetron hcl (Zofran) 4 mg tablet Take 1 Tablet by mouth every eight (8) hours as needed for Nausea or Vomiting. Qty: 8 Tablet, Refills: 0  Start date: 11/12/2021         CONTINUE these medications which have NOT CHANGED    Details   DULoxetine (CYMBALTA) 60 mg capsule Take 60 mg by mouth daily. albuterol (PROVENTIL HFA, VENTOLIN HFA, PROAIR HFA) 90 mcg/actuation inhaler Take 2 Puffs by inhalation daily.          STOP taking these medications       methocarbamoL (Robaxin) 500 mg tablet Comments:   Reason for Stopping:         buPROPion SR (Wellbutrin SR) 100 mg SR tablet Comments:   Reason for Stopping:         medroxyPROGESTERone (DEPO-PROVERA) 150 mg/mL syrg Comments:   Reason for Stopping:         predniSONE (DELTASONE) 20 mg tablet Comments:   Reason for Stopping:         tiZANidine (ZANAFLEX) 2 mg capsule Comments:   Reason for Stopping:               Procedures :none     Consults: None      PHYSICAL EXAM  Visit Vitals  /69 (BP 1 Location: Left upper arm, BP Patient Position: At rest;Lying)   Pulse 71   Temp 98.2 °F (36.8 °C)   Resp 17   Ht 5' 7\" (1.702 m)   Wt 109.8 kg (242 lb)   SpO2 98%   Breastfeeding No   BMI 37.90 kg/m²     General: Awake, cooperative, no acute distress    HEENT: NC, Atraumatic. PERRLA, EOMI. Anicteric sclerae. Lungs:  CTA Bilaterally. No Wheezing/Rhonchi/Rales. Heart:  Regular  rhythm,  No murmur, No Rubs, No Gallops  Abdomen: Soft, Non distended, Non tender. +Bowel sounds,   Extremities: No c/c/e  Psych:   Not anxious or agitated. Neurologic:  No acute neurological deficits. Admission HPI :     Grabiel Goldstein is a 19 y.o. female with h/o asthma and lo back pain due to accident, presents to the emergency department complaining of left lower quadrant abdominal pain that started 3 days ago with associated nausea vomiting and diarrhea.  Denies urinary symptoms, fever, chills, chest pain or SOB. Patient denies vaginal discharge.  Last menstrual period was 10/30. Patient advised that she was diagnosed with a sinus infection and was trying to start the antibiotics yesterday but unable to keep them down.  No history of abdominal surgeries in the past.   Hospital Course :      Edvin Teran is a 21 y.o. female presents to the emergency department complaining of left lower quadrant abdominal pain that started 3 days ago with associated nausea vomiting and diarrhea. Admitting for pyelonephritis for IV abx because pt likely to fail OP management on PO Abx because of persistent N/V. Since that she was admitted to the hospital, IV antibiotics was started with IV hydration.   Her nausea or vomiting resolved and abdominal pain resolved with the treatment. Urine culture negative. She has renal stone from ct scan- no obstruction. Recommend to follow-up with urologist as outpatient. Also lifestyle modification. Before discharge, her leukocytosis resolved. Pain resolved, she  tolerated diet very well. Recommend to continue p.o. Levaquin. Also recommend no pregnancy when she takes antibiotics. She indicated verbal understanding. We also continues breathing treatment for her reactive airway disease . No exacerbation noted.       Discharge planning discussed with patient, pt agrees  with the plan and no questions and concerns at this point. Activity: Activity as tolerated    Diet: Regular Diet    Follow-up: PCP and urologist     Disposition: home     Minutes spent on discharge: 45 min       Labs: Results:       Chemistry Recent Labs     11/12/21  0345 11/11/21  0412 11/10/21  1523   GLU 92 97 109*    139 138   K 4.7 3.7 3.8    108 106   CO2 27 25 26   BUN 7 10 10   CREA 0.71 0.81 0.82   CA 8.7 9.3 10.3*   AGAP 3 6 6   BUCR 10* 12 12   AP  --   --  121*   TP  --   --  8.9*   ALB  --   --  4.4   GLOB  --   --  4.5*   AGRAT  --   --  1.0      CBC w/Diff Recent Labs     11/12/21  0345 11/11/21  0412 11/10/21  1523   WBC 13.1 16.4* 18.9*   RBC 4.53 5.03 5.56*   HGB 13.3 14.5 16.4*   HCT 40.2 44.5 47.7*    246 440*   GRANS  --   --  84*   LYMPH  --   --  12*   EOS  --   --  0      Cardiac Enzymes Recent Labs     11/10/21  1523   CPK 67   CKND1 CALCULATION NOT PERFORMED WHEN RESULT IS BELOW LINEAR LIMIT      Coagulation No results for input(s): PTP, INR, APTT, INREXT in the last 72 hours. Lipid Panel No results found for: CHOL, CHOLPOCT, CHOLX, CHLST, CHOLV, 253636, HDL, HDLP, LDL, LDLC, DLDLP, 985880, VLDLC, VLDL, TGLX, TRIGL, TRIGP, TGLPOCT, CHHD, CHHDX   BNP No results for input(s): BNPP in the last 72 hours.    Liver Enzymes Recent Labs     11/10/21  1523   TP 8.9*   ALB 4.4   *      Thyroid Studies Lab Results   Component Value Date/Time    TSH 3.990 03/11/2019 04:20 PM          @micro    Significant Diagnostic Studies: CT ABD PELV W CONT    Result Date: 11/10/2021  EXAM: CT of the abdomen and pelvis INDICATION: Abdominal pain COMPARISON: April 11, 2021 TECHNIQUE: Axial CT imaging of the abdomen and pelvis was performed with intravenous contrast. Multiplanar reformats were generated. One or more dose reduction techniques were used on this CT: automated exposure control, adjustment of the mAs and/or kVp according to patient size, and iterative reconstruction techniques. The specific techniques used on this CT exam have been documented in the patient's electronic medical record. Digital Imaging and Communications in Medicine (DICOM) format image data are available to nonaffiliated external healthcare facilities or entities on a secure, media free, reciprocally searchable basis with patient authorization for at least a 12-month period after this study. _______________ FINDINGS: LOWER CHEST: Unremarkable. LIVER, BILIARY: Liver is normal. No biliary dilation. Gallbladder is unremarkable. PANCREAS: Normal. SPLEEN: Normal. ADRENALS: Normal. KIDNEYS: Right kidney is unremarkable. There is a nonobstructive calculus in the lower pole the left kidney measuring 7.4 mm. There is mild left hydronephrosis with periureteral stranding. No hydroureter or ureteral calculi seen. VASCULATURE: Unremarkable. There is a retroaortic left renal vein. LYMPH NODES: No enlarged lymph nodes. GASTROINTESTINAL TRACT: No bowel dilation or wall thickening. Appendix is normal. No bowel obstruction seen. PELVIC ORGANS: Unremarkable. BONES: No acute or aggressive osseous abnormalities identified. OTHER: None. _______________     Nonobstructive calculus in the lower pole the left kidney. There is left periureteral stranding concerning for pyelitis. No ureteral obstruction.     XR CHEST PORT    Result Date: 11/10/2021  EXAM: XR CHEST PORT CLINICAL INDICATION/HISTORY: chest pain -Additional: None COMPARISON: None TECHNIQUE: Frontal view of the chest _______________ FINDINGS: HEART AND MEDIASTINUM: Normal cardiac size and mediastinal contours. LUNGS AND PLEURAL SPACES: No focal pneumonic consolidation, pneumothorax, or pleural effusion.  BONY THORAX AND SOFT TISSUES: No acute osseous abnormality _______________     No acute radiographic cardiopulmonary abnormality             [facility]     CC: Lawrence Durham MD

## 2021-11-12 NOTE — PROGRESS NOTES
Pt was crying at start of shift expressing desire for her  to stay the night but aware that facility not allowing visitors to stay overnight. Pt gradually calmed down, given Toradol and Zofran for pain and nausea at that time. Denied vomiting, reported that appetite was better, prefered fruits to eat at this time. 0500 Pt calm and pleasant for rest of shift, did not need pain medication for rest of shift. Provided with applesauce    0715 Bedside and Verbal shift change report given to Elena Kenyon RN (oncoming nurse) by Marily Beckwith RN   (offgoing nurse). Report included the following information SBAR, Kardex, Intake/Output, MAR and Recent Results.

## 2022-06-03 NOTE — PROGRESS NOTES
191-Bedside and verbal shift change report given to REDD Garcia (oncoming nurse) by Norm Logan RN (offgoing nurse). Report included the following information SBAR, Kardex and MAR. Pt  at 40w5d gestation. Pt day two induction. Pt denies headache, SOB, N/V, epigastric pain and visual disturbances. Pt reports pain 8.5/10 on pain scale to lower back with ctx. Pt denies needs at this time. Bed in lowest position. Call light in reach. -SVE 6/70/-2 with bulging bag 
 
- Paged KALPANA Anglin about pt condition. Aaliyah Anglin said she would leave it up to pt if she wanted to keep going with labor and get epidural or stop pitocin and rest. Pt wanted to continue and get epidural 
 
-Paged Dr. Shanna Robles via [facility] for epidural placement. Call returned immediately and he is on his way to unit -Pt ambulated to restroom -Claudia Smith at bedside for epidural placement -Timeout complete at this time -Test dose given -100mcg Fentanyl given to Dr. Shanna Robles for bolus -Bolus dose given -Epidural complete [id number]- Tracing maternal  
 
2325-SVE 6/80/-2 
 
2329-AROM moderate, clear fluid 0415-SVE 7/80/-1 
 
0543-SVE 10/100/+1. Paged KALPANA Anglin, she is on her way to unit. RN to start pushing with pt 
0558-Pt starts pushing at this time. 0630-CNM at bedside for delivery 0646- viable baby girl, Apgars 7/9, , left vaginal laceration, repaired 0715-Bedside and verbal shift change report given to AMPARO Bobo RN (oncoming nurse) by REDD Machado RN (offgoing nurse). Report included the following information SBAR, Kardex and MAR. left upper arm

## 2024-07-29 NOTE — PROGRESS NOTES
Oral and Written notification given to patient and/or caregiver informing them that they are currently an Outpatient receiving care in our facility. Outpatient services include Observation Services under [facility] and [facility] requirements. Detail Level: Detailed